# Patient Record
Sex: FEMALE | Race: WHITE | NOT HISPANIC OR LATINO | Employment: FULL TIME | URBAN - METROPOLITAN AREA
[De-identification: names, ages, dates, MRNs, and addresses within clinical notes are randomized per-mention and may not be internally consistent; named-entity substitution may affect disease eponyms.]

---

## 2017-04-28 DIAGNOSIS — E03.9 HYPOTHYROIDISM: ICD-10-CM

## 2017-04-28 PROCEDURE — 84443 ASSAY THYROID STIM HORMONE: CPT | Performed by: INTERNAL MEDICINE

## 2017-04-28 PROCEDURE — 36415 COLL VENOUS BLD VENIPUNCTURE: CPT | Performed by: INTERNAL MEDICINE

## 2017-04-28 PROCEDURE — 84439 ASSAY OF FREE THYROXINE: CPT | Performed by: INTERNAL MEDICINE

## 2017-04-29 LAB
T4 FREE SERPL-MCNC: 1.01 NG/DL (ref 0.76–1.46)
TSH SERPL DL<=0.05 MIU/L-ACNC: 2.22 MU/L (ref 0.4–4)

## 2017-05-09 DIAGNOSIS — E06.3 HASHIMOTO'S THYROIDITIS: ICD-10-CM

## 2017-05-09 DIAGNOSIS — E06.3 HYPOTHYROIDISM DUE TO HASHIMOTO'S THYROIDITIS: ICD-10-CM

## 2017-05-11 RX ORDER — LEVOTHYROXINE SODIUM 112 UG/1
TABLET ORAL
Qty: 30 TABLET | Refills: 5 | Status: SHIPPED | OUTPATIENT
Start: 2017-05-11 | End: 2017-11-17

## 2017-05-11 NOTE — TELEPHONE ENCOUNTER
synthroid     Last Written Prescription Date: 10/20/16  Last Quantity: 30, # refills: 3  Last Office Visit with [facility], [facility] or [facility] prescribing provider: 10/18/16        TSH   Date Value Ref Range Status   04/28/2017 2.22 0.40 - 4.00 mU/L Final     Prescription approved per [facility] Refill Protocol.  Same dose per lab note.   Fiordaliza Harmon, RN  Triage Nurse

## 2017-11-17 ENCOUNTER — OFFICE VISIT (OUTPATIENT)
Dept: PEDIATRICS | Facility: CLINIC | Age: 37
End: 2017-11-17
Payer: COMMERCIAL

## 2017-11-17 VITALS
DIASTOLIC BLOOD PRESSURE: 58 MMHG | SYSTOLIC BLOOD PRESSURE: 100 MMHG | TEMPERATURE: 97.8 F | HEART RATE: 88 BPM | BODY MASS INDEX: 25.13 KG/M2 | OXYGEN SATURATION: 98 % | WEIGHT: 165.8 LBS | HEIGHT: 68 IN

## 2017-11-17 DIAGNOSIS — L30.9 ECZEMA, UNSPECIFIED TYPE: Primary | ICD-10-CM

## 2017-11-17 DIAGNOSIS — E06.3 HYPOTHYROIDISM DUE TO HASHIMOTO'S THYROIDITIS: ICD-10-CM

## 2017-11-17 DIAGNOSIS — K64.4 SKIN TAG OF PERIANAL REGION: ICD-10-CM

## 2017-11-17 PROCEDURE — 99213 OFFICE O/P EST LOW 20 MIN: CPT | Performed by: INTERNAL MEDICINE

## 2017-11-17 RX ORDER — LEVOTHYROXINE SODIUM 112 UG/1
112 TABLET ORAL DAILY
Qty: 30 TABLET | Refills: 5 | Status: SHIPPED | OUTPATIENT
Start: 2017-11-17 | End: 2018-01-17

## 2017-11-17 NOTE — PATIENT INSTRUCTIONS
1. Eczematous patch on face - avoid irritants.  Apply petroleum jelly to spot at night.  2. Anal skin tag - apply petroleum jelly.  Start sitz baths daily for 1 week.  Keep area clean and observe.  3. Call clinic for mammogram referral if needed.  4. Adela Phillips will do IUD changes.

## 2017-11-17 NOTE — NURSING NOTE
"Chief Complaint   Patient presents with     Derm Problem     lump on perianal, bumps under left eyelid       Initial /58 (BP Location: Right arm, Patient Position: Chair, Cuff Size: Adult Regular)  Pulse 88  Temp 97.8  F (36.6  C) (Oral)  Ht 5' 8.25\" (1.734 m)  Wt 165 lb 12.8 oz (75.2 kg)  SpO2 98%  BMI 25.03 kg/m2 Estimated body mass index is 25.03 kg/(m^2) as calculated from the following:    Height as of this encounter: 5' 8.25\" (1.734 m).    Weight as of this encounter: 165 lb 12.8 oz (75.2 kg).  Medication Reconciliation: complete   Jeannine Louie MA 10:24 AM 11/17/2017     "

## 2017-11-17 NOTE — MR AVS SNAPSHOT
After Visit Summary   11/17/2017    Janae Stanton    MRN: [id number]           Patient Information     Date Of Birth          1980        Visit Information        Provider Department      11/17/2017 10:10 AM Laury Ruelas [facility], [facility]        Today's Diagnoses     Hypothyroidism due to Hashimoto's thyroiditis        Hashimoto's thyroiditis          Care Instructions    1. Eczematous patch on face - avoid irritants.  Apply petroleum jelly to spot at night.  2. Anal skin tag - apply petroleum jelly.  Start sitz baths daily for 1 week.  Keep area clean and observe.  3. Call clinic for mammogram referral if needed.  4. Adela Phillips will do IUD changes.            Follow-ups after your visit        Who to contact     If you have questions or need follow up information about today's clinic visit or your schedule please contact [facility] directly at [phone].  Normal or non-critical lab and imaging results will be communicated to you by "[employer]hart, letter or phone within 4 business days after the clinic has received the results. If you do not hear from us within 7 days, please contact the clinic through [employer] or phone. If you have a critical or abnormal lab result, we will notify you by phone as soon as possible.  Submit refill requests through [employer] or call your pharmacy and they will forward the refill request to us. Please allow 3 business days for your refill to be completed.          Additional Information About Your Visit        "[employer] Information     [employer] gives you secure access to your electronic health record. If you see a primary care provider, you can also send messages to your care team and make appointments. If you have questions, please call your primary care clinic.  If you do not have a primary care provider, please call [phone] and they will assist you.        Care EveryWhere ID     This is your Care EveryWhere ID. This could be used  "by other organizations to access your [facility] medical records  [id number]        Your Vitals Were     Pulse Temperature Height Pulse Oximetry BMI (Body Mass Index)       88 97.8  F (36.6  C) (Oral) 5' 8.25\" (1.734 m) 98% 25.03 kg/m2        Blood Pressure from Last 3 Encounters:   11/17/17 100/58   10/18/16 103/63   03/23/16 100/62    Weight from Last 3 Encounters:   11/17/17 165 lb 12.8 oz (75.2 kg)   10/18/16 159 lb (72.1 kg)   03/23/16 158 lb 12.8 oz (72 kg)              Today, you had the following     No orders found for display         Today's Medication Changes          These changes are accurate as of: 11/17/17 10:59 AM.  If you have any questions, ask your nurse or doctor.               These medicines have changed or have updated prescriptions.        Dose/Directions    levothyroxine 112 MCG tablet   Commonly known as:  SYNTHROID/LEVOTHROID   This may have changed:  See the new instructions.   Used for:  Hypothyroidism due to Hashimoto's thyroiditis, Hashimoto's thyroiditis   Changed by:  Laury Ruelas Mai, MD        Dose:  112 mcg   Take 1 tablet (112 mcg) by mouth daily   Quantity:  30 tablet   Refills:  5            Where to get your medicines      These medications were sent to [facility] - [address]  [address], [place] [address]     Phone:  [phone]     levothyroxine 112 MCG tablet                Primary Care Provider Office Phone # Fax #    Sandra Duffy [phone] [address]  [facility] [address]        Equal Access to Services     [facility] GIL : Hadii jose guadalupe Crocker, waaxda luqadaha, qaybta kaaljuan carlos urbina. So [facility] [phone].    ATENCIÓN: Si habla español, tiene a emmanuel disposición servicios gratuitos de asistencia lingüística. Llame al [phone].    We comply with applicable federal civil rights laws and [place] laws. We do not " discriminate on the basis of race, color, national origin, age, disability, sex, sexual orientation, or gender identity.            Thank you!     Thank you for choosing [facility]  for your care. Our goal is always to provide you with excellent care. Hearing back from our patients is one way we can continue to improve our services. Please take a few minutes to complete the written survey that you may receive in the mail after your visit with us. Thank you!             Your Updated Medication List - Protect others around you: Learn how to safely use, store and throw away your medicines at www.disposemymeds.org.          This list is accurate as of: 11/17/17 10:59 AM.  Always use your most recent med list.                   Brand Name Dispense Instructions for use Diagnosis    levonorgestrel 20 MCG/24HR IUD    MIRENA    1 each    1 each (20 mcg) by Intrauterine route once for 1 dose Started 11/2013        levothyroxine 112 MCG tablet    SYNTHROID/LEVOTHROID    30 tablet    Take 1 tablet (112 mcg) by mouth daily    Hypothyroidism due to Hashimoto's thyroiditis, Hashimoto's thyroiditis

## 2017-11-17 NOTE — PROGRESS NOTES
SUBJECTIVE:   Janae Stanton is a 37 year old female who presents to clinic today for the following health issues:    Lumps on skin      Duration: 4 weeks ago in perianal area, 3 weeks for under eye    Description (location/character/radiation): n/a    Intensity:  moderate    Accompanying signs and symptoms: redness     History (similar episodes/previous evaluation): None    Precipitating or alleviating factors: None    Therapies tried and outcome: Nystatin, hydrocortisone cream and lotion for bumps under the eyes, no relief     PROBLEMS TO ADD ON:  Pt has not established a OB GYN and has a Mirena IUD that is due to be changed. She would like to discuss options on who to see for removing and replacing with new Mirena or discussing other BC options. She would also like a referral for a mammo.       Problem list and histories reviewed & adjusted, as indicated.  Additional history: as documented    Patient Active Problem List   Diagnosis     H/O vitamin D deficiency     CARDIOVASCULAR SCREENING; LDL GOAL LESS THAN 160     Hypothyroidism     IUD (intrauterine device) in place     Hashimoto's thyroiditis     Past Surgical History:   Procedure Laterality Date     NO HISTORY OF SURGERY         Social History   Substance Use Topics     Smoking status: Never Smoker     Smokeless tobacco: Never Used     Alcohol use 0.0 oz/week     0 Standard drinks or equivalent per week      Comment: Rare     Family History   Problem Relation Age of Onset     Hypertension Mother      alive age 59  DM, lipids     Depression Father      alive age 61 PTST     Thyroid Disease Sister      alive age 32     Family History Negative Sister      alive age  30 sinus problems     Depression Sister      alive age  27     HEART DISEASE Paternal Grandmother           HEART DISEASE Maternal Grandmother           Thyroid Disease Maternal Grandfather          Current Outpatient Prescriptions   Medication Sig Dispense Refill      "levothyroxine (SYNTHROID/LEVOTHROID) 112 MCG tablet Take 1 tablet (112 mcg) by mouth daily 30 tablet 5     levonorgestrel (MIRENA) 20 MCG/24HR IUD 1 each (20 mcg) by Intrauterine route once for 1 dose Started 11/2013 1 each 0     [DISCONTINUED] levothyroxine (SYNTHROID/LEVOTHROID) 112 MCG tablet TAKE ONE TABLET BY MOUTH EVERY DAY 30 tablet 5     Allergies   Allergen Reactions     Sulfa Drugs      GI issues         Reviewed and updated as needed this visit by clinical staff     Reviewed and updated as needed this visit by Provider         ROS:  All other systems on a 10-point review are negative, unless otherwise noted in HPI    OBJECTIVE:     /58 (BP Location: Right arm, Patient Position: Chair, Cuff Size: Adult Regular)  Pulse 88  Temp 97.8  F (36.6  C) (Oral)  Ht 5' 8.25\" (1.734 m)  Wt 165 lb 12.8 oz (75.2 kg)  SpO2 98%  BMI 25.03 kg/m2  Body mass index is 25.03 kg/(m^2).  GENERAL: healthy, alert and no distress  NECK: no adenopathy, no asymmetry, masses, or scars and thyroid normal to palpation  RESP: lungs clear to auscultation - no rales, rhonchi or wheezes  CV: regular rate and rhythm, normal S1 S2, no S3 or S4, no murmur, click or rub, no peripheral edema and peripheral pulses strong  ABDOMEN: soft, nontender, no hepatosplenomegaly, no masses and bowel sounds normal  MS: no gross musculoskeletal defects noted, no edema  SKIN: face - small 1-2 mm pink papule with surrounding patchiness under left eye.  PERENUM: skin tag present at right side of anus approx 0.5 cm in size with 2 mm clean, shallow fissure near its base.  No bleeding, sinus track present, no drainage/pus, no erythema or swelling.    ASSESSMENT/PLAN:         1. Eczema, unspecified type Eczematous patch on face   - avoid irritants.  Apply petroleum jelly to spot at night.    2. Skin tag of perianal region - with tiny clean, shallow anal fissure  - Apply petroleum jelly.    - Start sitz baths daily for 1 week.    - Keep area clean and " observe.    3. Hypothyroidism due to Hashimoto's thyroiditis  Refill provided  - levothyroxine (SYNTHROID/LEVOTHROID) 112 MCG tablet; Take 1 tablet (112 mcg) by mouth daily  Dispense: 30 tablet; Refill: 5    Referral to Adela Recinos for IUD removal and placement    See Patient Instructions    Sahnnen Ruelas MD  [facility]

## 2017-12-07 ENCOUNTER — MYC MEDICAL ADVICE (OUTPATIENT)
Dept: PEDIATRICS | Facility: CLINIC | Age: 37
End: 2017-12-07

## 2017-12-07 DIAGNOSIS — E06.3 HASHIMOTO'S THYROIDITIS: Primary | ICD-10-CM

## 2017-12-07 DIAGNOSIS — E06.3 HYPOTHYROIDISM DUE TO HASHIMOTO'S THYROIDITIS: ICD-10-CM

## 2017-12-07 NOTE — TELEPHONE ENCOUNTER
Patient asking for a referral to [facility] [place] [facility] in [place].  Pended, please review and sign if ok.  Martha Redmond RN  Message handled by Nurse Triage.

## 2017-12-15 ENCOUNTER — TELEPHONE (OUTPATIENT)
Dept: ENDOCRINOLOGY | Facility: CLINIC | Age: 37
End: 2017-12-15

## 2017-12-15 NOTE — TELEPHONE ENCOUNTER
----- Message from Rebecca Guthrie sent at 12/15/2017  2:34 PM CST -----  Regarding: New Referral- Hashimoto's  Contact: [phone]  Pt is being referred by Dr. Barron for Hashimoto's thyroiditis. Referral and records are in the system.     Thank you,    Rebecca  Referral / Discharge Coordinator

## 2017-12-15 NOTE — TELEPHONE ENCOUNTER
To schedulers: Please schedule her for lst available endocrine    Mitzi Castaneda MD  Endocrine triage

## 2018-01-13 ASSESSMENT — ENCOUNTER SYMPTOMS
WEIGHT LOSS: 0
DOUBLE VISION: 0
EYE PAIN: 0
EYE REDNESS: 0
DECREASED APPETITE: 0
EYE WATERING: 0
POLYDIPSIA: 0
FATIGUE: 1
WEIGHT GAIN: 1
EYE IRRITATION: 0
POLYPHAGIA: 0
NIGHT SWEATS: 1
HALLUCINATIONS: 0
POOR WOUND HEALING: 0
SKIN CHANGES: 0
NAIL CHANGES: 0
FEVER: 0
CHILLS: 1
ALTERED TEMPERATURE REGULATION: 0
INCREASED ENERGY: 0

## 2018-01-17 ENCOUNTER — OFFICE VISIT (OUTPATIENT)
Dept: ENDOCRINOLOGY | Facility: CLINIC | Age: 38
End: 2018-01-17
Payer: COMMERCIAL

## 2018-01-17 VITALS
HEIGHT: 68 IN | SYSTOLIC BLOOD PRESSURE: 110 MMHG | WEIGHT: 160.4 LBS | DIASTOLIC BLOOD PRESSURE: 67 MMHG | HEART RATE: 97 BPM | BODY MASS INDEX: 24.31 KG/M2

## 2018-01-17 DIAGNOSIS — R53.83 FATIGUE, UNSPECIFIED TYPE: ICD-10-CM

## 2018-01-17 DIAGNOSIS — E03.9 ACQUIRED HYPOTHYROIDISM: Primary | ICD-10-CM

## 2018-01-17 DIAGNOSIS — E06.3 HASHIMOTO'S THYROIDITIS: ICD-10-CM

## 2018-01-17 DIAGNOSIS — E06.3 HYPOTHYROIDISM DUE TO HASHIMOTO'S THYROIDITIS: ICD-10-CM

## 2018-01-17 DIAGNOSIS — E06.3 HASHIMOTO'S THYROIDITIS: Primary | ICD-10-CM

## 2018-01-17 LAB
ALBUMIN SERPL-MCNC: 4 G/DL (ref 3.4–5)
ALP SERPL-CCNC: 51 U/L (ref 40–150)
ALT SERPL W P-5'-P-CCNC: 16 U/L (ref 0–50)
ANION GAP SERPL CALCULATED.3IONS-SCNC: 8 MMOL/L (ref 3–14)
AST SERPL W P-5'-P-CCNC: 15 U/L (ref 0–45)
BILIRUB SERPL-MCNC: 0.5 MG/DL (ref 0.2–1.3)
BUN SERPL-MCNC: 10 MG/DL (ref 7–30)
CALCIUM SERPL-MCNC: 8.9 MG/DL (ref 8.5–10.1)
CHLORIDE SERPL-SCNC: 105 MMOL/L (ref 94–109)
CO2 SERPL-SCNC: 26 MMOL/L (ref 20–32)
CREAT SERPL-MCNC: 0.7 MG/DL (ref 0.52–1.04)
DEPRECATED CALCIDIOL+CALCIFEROL SERPL-MC: 21 UG/L (ref 20–75)
ERYTHROCYTE [DISTWIDTH] IN BLOOD BY AUTOMATED COUNT: 12 % (ref 10–15)
FERRITIN SERPL-MCNC: 78 NG/ML (ref 12–150)
GFR SERPL CREATININE-BSD FRML MDRD: >90 ML/MIN/1.7M2
GLUCOSE SERPL-MCNC: 93 MG/DL (ref 70–99)
HCT VFR BLD AUTO: 39.1 % (ref 35–47)
HGB BLD-MCNC: 13.1 G/DL (ref 11.7–15.7)
MCH RBC QN AUTO: 29.8 PG (ref 26.5–33)
MCHC RBC AUTO-ENTMCNC: 33.5 G/DL (ref 31.5–36.5)
MCV RBC AUTO: 89 FL (ref 78–100)
PLATELET # BLD AUTO: 281 10E9/L (ref 150–450)
POTASSIUM SERPL-SCNC: 4.1 MMOL/L (ref 3.4–5.3)
PROT SERPL-MCNC: 7.5 G/DL (ref 6.8–8.8)
RBC # BLD AUTO: 4.4 10E12/L (ref 3.8–5.2)
SODIUM SERPL-SCNC: 139 MMOL/L (ref 133–144)
T4 FREE SERPL-MCNC: 1.01 NG/DL (ref 0.76–1.46)
TSH SERPL DL<=0.005 MIU/L-ACNC: 4.35 MU/L (ref 0.4–4)
VIT B12 SERPL-MCNC: 378 PG/ML (ref 193–986)
WBC # BLD AUTO: 5 10E9/L (ref 4–11)

## 2018-01-17 RX ORDER — LEVOTHYROXINE SODIUM 125 UG/1
125 TABLET ORAL DAILY
Qty: 90 TABLET | Refills: 3 | Status: SHIPPED | OUTPATIENT
Start: 2018-01-17 | End: 2019-02-26

## 2018-01-17 ASSESSMENT — PAIN SCALES - GENERAL: PAINLEVEL: NO PAIN (0)

## 2018-01-17 NOTE — MR AVS SNAPSHOT
After Visit Summary   1/17/2018    Janae Stanton    MRN: [id number]           Patient Information     Date Of Birth          1980        Visit Information        Provider Department      1/17/2018 7:00 AM Kaleigh Griffin MD [facility]        Today's Diagnoses     Hashimoto's thyroiditis    -  1    Hypothyroidism due to Hashimoto's thyroiditis        Fatigue, unspecified type           Follow-ups after your visit        Follow-up notes from your care team     Return in about 6 weeks (around 2/28/2018).      Your next 10 appointments already scheduled     Feb 28, 2018  7:00 AM CST   (Arrive by 6:45 AM)   RETURN ENDOCRINE with MD KRISTOPHER Cedeno [facility] ([facility] [place])    [address] [address] [phone]              Who to contact     Please call your clinic at [phone] to:    Ask questions about your health    Make or cancel appointments    Discuss your medicines    Learn about your test results    Speak to your doctor   If you have compliments or concerns about an experience at your clinic, or if you wish to file a complaint, please contact [facility] Patient Relations at [phone] or email us at [email or web address]         Additional Information About Your Visit        MyChart Information     [employer] gives you secure access to your electronic health record. If you see a primary care provider, you can also send messages to your care team and make appointments. If you have questions, please call your primary care clinic.  If you do not have a primary care provider, please call [phone] and they will assist you.      [employer] is an electronic gateway that provides easy, online access to your medical records. With [employer], you can request a clinic appointment, read your test results, renew a prescription or communicate with your care team.     To access  "your existing account, please contact your [facility] or call [phone] for assistance.        Care EveryWhere ID     This is your Care EveryWhere ID. This could be used by other organizations to access your [facility] medical records  [id number]        Your Vitals Were     Pulse Height BMI (Body Mass Index)             97 1.727 m (5' 8\") 24.39 kg/m2          Blood Pressure from Last 3 Encounters:   01/17/18 110/67   11/17/17 100/58   10/18/16 103/63    Weight from Last 3 Encounters:   01/17/18 72.8 kg (160 lb 6.4 oz)   11/17/17 75.2 kg (165 lb 12.8 oz)   10/18/16 72.1 kg (159 lb)               Primary Care Provider Office Phone # Fax #    Sandra Duffy [phone] [address] [address]        Equal Access to Services     CHELSI CORDERO AH: Hadii aad ku hadasho Soomaali, waaxda luqadaha, qaybta kaalmada adeegyada, waxay idiin hayaan chandler [place] [address]rd . So [facility] [phone].    ATENCIÓN: Si oliviala espjeff, tiene a emmanuel disposición servicios gratuitos de asistencia lingüística. Llame al [phone].    We comply with applicable federal civil rights laws and [place] laws. We do not discriminate on the basis of race, color, national origin, age, disability, sex, sexual orientation, or gender identity.            Thank you!     Thank you for choosing [facility]  for your care. Our goal is always to provide you with excellent care. Hearing back from our patients is one way we can continue to improve our services. Please take a few minutes to complete the written survey that you may receive in the mail after your visit with us. Thank you!             Your Updated Medication List - Protect others around you: Learn how to safely use, store and throw away your medicines at www.disposemymeds.org.          This list is accurate as of: 1/17/18  8:04 AM.  Always use your most recent med list.                   Brand Name Dispense Instructions " for use Diagnosis    levonorgestrel 20 MCG/24HR IUD    MIRENA    1 each    1 each (20 mcg) by Intrauterine route once for 1 dose Started 11/2013        levothyroxine 112 MCG tablet    SYNTHROID/LEVOTHROID    30 tablet    Take 1 tablet (112 mcg) by mouth daily    Hypothyroidism due to Hashimoto's thyroiditis

## 2018-01-17 NOTE — PROGRESS NOTES
Endocrinology Clinic Visit 1/17/2018  NAME:  Janae Stanton  PCP:  CliveSandra AJ  MRN:  [id number]  Reason for Consult:  Hypothyroidism  Requesting Provider:  Jossy Barron    Chief Complaint     Chief Complaint   Patient presents with     Consult     NEW HASHIMOTOS       History of Present Illness     Janae Stanton is a 37 year old female who is seen in clinic for management of hypothyroidism.    The patient was diagnosed with hypothyroidism in 2005, about 3 months after delivery of her first child.  TSH then was around 5.  She had it checked because her sister was diagnosed with hypothyroidism.  The patient herself did not have many symptoms then, but elected to start on levothyroxine.  She then moved to [place] in 2007 and stayed there until 2014.  She had 2 children there and was managed during her pregnancy is and throughout her course there by an endocrinologist.  Her dose prior to returning to the [place] was 88 mcg.  After returning here her dose needs went up.  At this point she is taking 112 mcg a day.  He reports that sometimes she will miss a day or a few days because she is so busy.  Her TSH here since her return has fluctuated mostly between 0.9 and 5.3.  Last TSH was 2.2 in April 2017.    The patient reports that lately she has been feeling more fatigued.  Has been developing gradually in the last 1-2 years, and getting worse.  She feels that by mid morning she is ready to go back to sleep.  She also can sit on the couch in the evening and fall asleep if she could.  Having to take care of her kids is what keeps her up.  She goes to bed around 10 or 11 and gets at least 7 hours of sleep at night every day.  She used to play a lot of sports in college and used to exercise more avidly, but right now she is not following any routine exercise regimen, but she is active in her job and with her kids.  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  Lately she has  noticed weight gain of about 10 pounds in the last year which for her is significant, and it has been harder to lose it.  She has been able to lose 5 pounds in the last 2 months by intensifying her exercise size and diet.  Mostly watching her diet especially carbohydrates, and eating less.  She finds it harder now to lose weight compared to the past.  Her menses is regular and she is on the Mirena IUD so it is very scant but is predictable.  She has been having occasional night sweats, but no hot flashes in the day.  She has some abdominal bloating and occasional constipation but no diarrhea.  She has been more irritable.  She is always cold intolerant.    No recent history of febrile illness with neck pain or sore throat.     Localized symptoms: there is no throat swelling, trouble swallowing, no SOB when lying flat, and no voice changes.     Family history of thyroid disease: Yes: sister  Family history of thyroid cancer: No  Personal history of high-dose radiation especially in childhood: No    Problem List     Patient Active Problem List   Diagnosis     H/O vitamin D deficiency     CARDIOVASCULAR SCREENING; LDL GOAL LESS THAN 160     Hypothyroidism     IUD (intrauterine device) in place     Hashimoto's thyroiditis        Medications     Current Outpatient Prescriptions   Medication     levothyroxine (SYNTHROID/LEVOTHROID) 112 MCG tablet     levonorgestrel (MIRENA) 20 MCG/24HR IUD     No current facility-administered medications for this visit.         Allergies     Allergies   Allergen Reactions     Sulfa Drugs      GI issues       Medical / Surgical History     Past Medical History:   Diagnosis Date     ALLERGIC RHINITIS  2000     CHRONIC SINUSITIS      Hashimoto's thyroiditis 10/18/2016     Hypothyroid      NONSPECIF SKIN ERUPT NEC 7/20/2007     SUPERVIS OTHER NORMAL PREG 6/26/2005     Past Surgical History:   Procedure Laterality Date     NO HISTORY OF SURGERY         Social History     Social History      Social History     Marital status:      Spouse name: Marky     Number of children: 0     Years of education: 16     Occupational History     nurse [employer]     Social History Main Topics     Smoking status: Never Smoker     Smokeless tobacco: Never Used     Alcohol use 0.0 oz/week     0 Standard drinks or equivalent per week      Comment: Rare     Drug use: No     Sexual activity: Yes     Partners: Male     Birth control/ protection: IUD     Other Topics Concern     Parent/Sibling W/ Cabg, Mi Or Angioplasty Before 65f 55m? No     Social History Narrative       Family History     Family History   Problem Relation Age of Onset     Hypertension Mother      alive age 59  DM, lipids     Depression Father      alive age 61 PTST     Thyroid Disease Sister      alive age 32     Family History Negative Sister      alive age  30 sinus problems     Depression Sister      alive age  27     HEART DISEASE Paternal Grandmother           HEART DISEASE Maternal Grandmother           Thyroid Disease Maternal Grandfather        ROS     Constitutional: no fevers, chills, night sweats. No weight loss/gain. No fatigue. Good appetite  Eyes: no vision changes, no eye redness, no diplopia  Ears, Nose, mouth, throat: no hearing changes, no tinnitus, no rhinorrhea, no nasal congestion, no anosmia  Cardiovascular: no chest pain, no orthopnea or PND, no edema, no palpitations  Respiratory: no dyspnea, no cough, no sputum, no wheezing  Gastrointestinal: no nausea, no vomiting, no abdominal pain, no diarrhea, no constipation  Genitourinary: no dysuria, no frequency, no urgency, no nocturia  Musculoskeletal: no joint pains, no back pain, no cramps, no fractures  Skin: no rash, no itching, no dryness, no ulcers, no hair loss, no nail changes  Neurological:no headaches, no weakness, no numbness, no tingling, no tremors, no difficulty sleeping, no snoring, no AM sleepiness  Psychiatric: no anxiety, no  "sadness  Hematologic/lymphatic: no easy bruising, no bleeding, no palor    Physical Exam   /67  Pulse 97  Ht 1.727 m (5' 8\")  Wt 72.8 kg (160 lb 6.4 oz)  BMI 24.39 kg/m2     General: Comfortable, no obvious distress, normal body habitus  Eyes: Sclera anicteric, moist conjunctiva, no lid lag, no exophthalmos  HENT: Atraumatic, oropharynx clear, moist mucous membranes with no mucosal ulcerations  Neck: Trachea midline, supple. Thyroid: Thyroid is normal in size and texture  CV: Regular rhythm, normal rate. No murmurs auscultated  Resp: Clear to auscultation bilaterally, good effort  Abdomen:  Soft, non tender, non distended. Bowel sounds heard. No organomegaly. No striae  Skin: No rashes, lesions, or subcutaneous nodules.   Psych: Alert and oriented x 3. Appropriate affect, good insight  Extremities: No peripheral edema  Musculoskeletal: Appropriate muscle bulk and strength  Lymphatic: No cervical lymphadenopathy  Neuro: Moves all four extremities. No focal deficits on limited exam. Gait normal. No resting tremor, deep tendon reflexes with normal relaxation phase.     Labs/Imaging     Pertinent Labs were reviewed and updated in [employer].  Radiology Results were  reviewed and updated in EPIC.    Summary of recent findings:     TSH   Date Value Ref Range Status   04/28/2017 2.22 0.40 - 4.00 mU/L Final   10/18/2016 3.92 0.40 - 4.00 mU/L Final   06/29/2016 2.78 0.40 - 4.00 mU/L Final   03/22/2016 5.37 (H) 0.40 - 4.00 mU/L Final   06/04/2015 3.11 0.40 - 4.00 mU/L Final     T4 Free   Date Value Ref Range Status   04/28/2017 1.01 0.76 - 1.46 ng/dL Final   10/18/2016 1.11 0.76 - 1.46 ng/dL Final   06/29/2016 1.02 0.76 - 1.46 ng/dL Final   03/22/2016 1.15 0.76 - 1.46 ng/dL Final   09/08/2004 1.26 0.70 - 1.85 ng/dL Final       Impression / Plan     1. (E06.3) Hashimoto's thyroiditis  (primary encounter diagnosis)  2. (E03.8,  E06.3) Hypothyroidism due to Hashimoto's thyroiditis  Comment: We discussed the pathogenesis of " hypothyroidism. In most cases it is an autoimmune process that results in lymphocytic thyroiditis, also knows as Hashimoto's thyroiditis, which gradually destroys the gland's ability to produce and secrete thyroid hormone. There is a genetic predisposition in most cases, and possibly an environmental trigger. There is no treatment that would reverse/stop the autoimmune process. Treatment is aimed at replacing thyroid hormone, with levothyroxine, which is identical to our own T4. The dose is adjusted to target a normal TSH, which is a signal from the pituitary gland.   Patients with hypothyroidism usually have positive thyroid antibodies, namely thyroid peroxidase antibody (anti-TPO) and anti-thyroglobulin antibody.     We also discussed the ideal target TSH levels.  Unclear what that would be for her however she seemed to think that her TSH was more consistent under 2 in [place] and she felt better at that level.    Plan: We will check her TSH and free T4, and aim for a TSH closer to 1 or below while still in the normal range.  We will follow-up on her symptoms in about 6 weeks.    3. (R59.83) Fatigue, unspecified type  Comment: It could be thyroid related, or other factors.  We are check thyroid status as per above #1, 2 .  she has not had any labs in the past few years.  Is a history of vitamin D deficiency.  With her autoimmune thyroid disease she is at risk for other autoimmune diseases.  I will run a general basic workup for fatigue as per the plan below.    Plan: CBC with platelets, Ferritin, Vitamin D         Deficiency (D3 Only), Vitamin B12,         Comprehensive metabolic panel    Test and/or medications prescribed today:  Orders Placed This Encounter   Procedures     CBC with platelets     Ferritin     TSH     T4 free     Vitamin D Deficiency (D3 Only)     Vitamin B12     Comprehensive metabolic panel         Follow up: 6 weeks      Kaleigh Griffin MD  Endocrinology, Diabetes and Metabolism  [facility]  [place]

## 2018-01-17 NOTE — LETTER
1/17/2018       RE: Janae Stanton  [address]  [facility] [place] [address]     Dear Colleague,    Thank you for referring your patient, Janae Stanton, to the [facility] ENDOCRINOLOGY at [facility]. Please see a copy of my visit note below.    Endocrinology Clinic Visit 1/17/2018  NAME:  Janae Stanton  PCP:  Sandra Duffy  MRN:  [id number]  Reason for Consult:  Hypothyroidism  Requesting Provider:  Jossy Barron    Chief Complaint     Chief Complaint   Patient presents with     Consult     NEW HASHIMOTOS       History of Present Illness     Janae Stanton is a 37 year old female who is seen in clinic for management of hypothyroidism.    The patient was diagnosed with hypothyroidism in 2005, about 3 months after delivery of her first child.  TSH then was around 5.  She had it checked because her sister was diagnosed with hypothyroidism.  The patient herself did not have many symptoms then, but elected to start on levothyroxine.  She then moved to [place] in 2007 and stayed there until 2014.  She had 2 children there and was managed during her pregnancy is and throughout her course there by an endocrinologist.  Her dose prior to returning to the [place] was 88 mcg.  After returning here her dose needs went up.  At this point she is taking 112 mcg a day.  He reports that sometimes she will miss a day or a few days because she is so busy.  Her TSH here since her return has fluctuated mostly between 0.9 and 5.3.  Last TSH was 2.2 in April 2017.    The patient reports that lately she has been feeling more fatigued.  Has been developing gradually in the last 1-2 years, and getting worse.  She feels that by mid morning she is ready to go back to sleep.  She also can sit on the couch in the evening and fall asleep if she could.  Having to take care of her kids is what keeps her up.  She goes to bed around 10 or 11 and gets at least 7 hours of sleep at night every day.  She  used to play a lot of sports in college and used to exercise more avidly, but right now she is not following any routine exercise regimen, but she is active in her job and with her kids.  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  Lately she has noticed weight gain of about 10 pounds in the last year which for her is significant, and it has been harder to lose it.  She has been able to lose 5 pounds in the last 2 months by intensifying her exercise size and diet.  Mostly watching her diet especially carbohydrates, and eating less.  She finds it harder now to lose weight compared to the past.  Her menses is regular and she is on the Mirena IUD so it is very scant but is predictable.  She has been having occasional night sweats, but no hot flashes in the day.  She has some abdominal bloating and occasional constipation but no diarrhea.  She has been more irritable.  She is always cold intolerant.    No recent history of febrile illness with neck pain or sore throat.     Localized symptoms: there is no throat swelling, trouble swallowing, no SOB when lying flat, and no voice changes.     Family history of thyroid disease: Yes: sister  Family history of thyroid cancer: No  Personal history of high-dose radiation especially in childhood: No    Problem List     Patient Active Problem List   Diagnosis     H/O vitamin D deficiency     CARDIOVASCULAR SCREENING; LDL GOAL LESS THAN 160     Hypothyroidism     IUD (intrauterine device) in place     Hashimoto's thyroiditis        Medications     Current Outpatient Prescriptions   Medication     levothyroxine (SYNTHROID/LEVOTHROID) 112 MCG tablet     levonorgestrel (MIRENA) 20 MCG/24HR IUD     No current facility-administered medications for this visit.         Allergies     Allergies   Allergen Reactions     Sulfa Drugs      GI issues       Medical / Surgical History     Past Medical History:   Diagnosis Date     ALLERGIC RHINITIS  2000     CHRONIC  SINUSITIS      Hashimoto's thyroiditis 10/18/2016     Hypothyroid      NONSPECIF SKIN ERUPT NEC 2007     SUPERVIS OTHER NORMAL PREG 2005     Past Surgical History:   Procedure Laterality Date     NO HISTORY OF SURGERY         Social History     Social History     Social History     Marital status:      Spouse name: Marky     Number of children: 0     Years of education: 16     Occupational History     nurse [employer]     Social History Main Topics     Smoking status: Never Smoker     Smokeless tobacco: Never Used     Alcohol use 0.0 oz/week     0 Standard drinks or equivalent per week      Comment: Rare     Drug use: No     Sexual activity: Yes     Partners: Male     Birth control/ protection: IUD     Other Topics Concern     Parent/Sibling W/ Cabg, Mi Or Angioplasty Before 65f 55m? No     Social History Narrative       Family History     Family History   Problem Relation Age of Onset     Hypertension Mother      alive age 59  DM, lipids     Depression Father      alive age 61 PTST     Thyroid Disease Sister      alive age 32     Family History Negative Sister      alive age  30 sinus problems     Depression Sister      alive age  27     HEART DISEASE Paternal Grandmother           HEART DISEASE Maternal Grandmother           Thyroid Disease Maternal Grandfather        ROS     Constitutional: no fevers, chills, night sweats. No weight loss/gain. No fatigue. Good appetite  Eyes: no vision changes, no eye redness, no diplopia  Ears, Nose, mouth, throat: no hearing changes, no tinnitus, no rhinorrhea, no nasal congestion, no anosmia  Cardiovascular: no chest pain, no orthopnea or PND, no edema, no palpitations  Respiratory: no dyspnea, no cough, no sputum, no wheezing  Gastrointestinal: no nausea, no vomiting, no abdominal pain, no diarrhea, no constipation  Genitourinary: no dysuria, no frequency, no urgency, no nocturia  Musculoskeletal: no joint pains, no back  "pain, no cramps, no fractures  Skin: no rash, no itching, no dryness, no ulcers, no hair loss, no nail changes  Neurological:no headaches, no weakness, no numbness, no tingling, no tremors, no difficulty sleeping, no snoring, no AM sleepiness  Psychiatric: no anxiety, no sadness  Hematologic/lymphatic: no easy bruising, no bleeding, no palor    Physical Exam   /67  Pulse 97  Ht 1.727 m (5' 8\")  Wt 72.8 kg (160 lb 6.4 oz)  BMI 24.39 kg/m2     General: Comfortable, no obvious distress, normal body habitus  Eyes: Sclera anicteric, moist conjunctiva, no lid lag, no exophthalmos  HENT: Atraumatic, oropharynx clear, moist mucous membranes with no mucosal ulcerations  Neck: Trachea midline, supple. Thyroid: Thyroid is normal in size and texture  CV: Regular rhythm, normal rate. No murmurs auscultated  Resp: Clear to auscultation bilaterally, good effort  Abdomen:  Soft, non tender, non distended. Bowel sounds heard. No organomegaly. No striae  Skin: No rashes, lesions, or subcutaneous nodules.   Psych: Alert and oriented x 3. Appropriate affect, good insight  Extremities: No peripheral edema  Musculoskeletal: Appropriate muscle bulk and strength  Lymphatic: No cervical lymphadenopathy  Neuro: Moves all four extremities. No focal deficits on limited exam. Gait normal. No resting tremor, deep tendon reflexes with normal relaxation phase.     Labs/Imaging     Pertinent Labs were reviewed and updated in [facility].  Radiology Results were  reviewed and updated in EPIC.    Summary of recent findings:     TSH   Date Value Ref Range Status   04/28/2017 2.22 0.40 - 4.00 mU/L Final   10/18/2016 3.92 0.40 - 4.00 mU/L Final   06/29/2016 2.78 0.40 - 4.00 mU/L Final   03/22/2016 5.37 (H) 0.40 - 4.00 mU/L Final   06/04/2015 3.11 0.40 - 4.00 mU/L Final     T4 Free   Date Value Ref Range Status   04/28/2017 1.01 0.76 - 1.46 ng/dL Final   10/18/2016 1.11 0.76 - 1.46 ng/dL Final   06/29/2016 1.02 0.76 - 1.46 ng/dL Final   03/22/2016 " 1.15 0.76 - 1.46 ng/dL Final   09/08/2004 1.26 0.70 - 1.85 ng/dL Final       Impression / Plan     1. (E06.3) Hashimoto's thyroiditis  (primary encounter diagnosis)  2. (E03.8,  E06.3) Hypothyroidism due to Hashimoto's thyroiditis  Comment: We discussed the pathogenesis of hypothyroidism. In most cases it is an autoimmune process that results in lymphocytic thyroiditis, also knows as Hashimoto's thyroiditis, which gradually destroys the gland's ability to produce and secrete thyroid hormone. There is a genetic predisposition in most cases, and possibly an environmental trigger. There is no treatment that would reverse/stop the autoimmune process. Treatment is aimed at replacing thyroid hormone, with levothyroxine, which is identical to our own T4. The dose is adjusted to target a normal TSH, which is a signal from the pituitary gland.   Patients with hypothyroidism usually have positive thyroid antibodies, namely thyroid peroxidase antibody (anti-TPO) and anti-thyroglobulin antibody.     We also discussed the ideal target TSH levels.  Unclear what that would be for her however she seemed to think that her TSH was more consistent under 2 in [place] and she felt better at that level.    Plan: We will check her TSH and free T4, and aim for a TSH closer to 1 or below while still in the normal range.  We will follow-up on her symptoms in about 6 weeks.    3. (R53.83) Fatigue, unspecified type  Comment: It could be thyroid related, or other factors.  We are check thyroid status as per above #1, 2 .  she has not had any labs in the past few years.  Is a history of vitamin D deficiency.  With her autoimmune thyroid disease she is at risk for other autoimmune diseases.  I will run a general basic workup for fatigue as per the plan below.    Plan: CBC with platelets, Ferritin, Vitamin D         Deficiency (D3 Only), Vitamin B12,         Comprehensive metabolic panel    Test and/or medications prescribed today:  Orders  Placed This Encounter   Procedures     CBC with platelets     Ferritin     TSH     T4 free     Vitamin D Deficiency (D3 Only)     Vitamin B12     Comprehensive metabolic panel         Follow up: 6 weeks      Kaleigh Griffin MD  Endocrinology, Diabetes and Metabolism  [facility]

## 2018-02-18 ENCOUNTER — OFFICE VISIT (OUTPATIENT)
Dept: URGENT CARE | Facility: URGENT CARE | Age: 38
End: 2018-02-18
Payer: COMMERCIAL

## 2018-02-18 VITALS
DIASTOLIC BLOOD PRESSURE: 60 MMHG | WEIGHT: 158 LBS | HEART RATE: 62 BPM | SYSTOLIC BLOOD PRESSURE: 96 MMHG | OXYGEN SATURATION: 99 % | BODY MASS INDEX: 24.02 KG/M2 | TEMPERATURE: 97.4 F

## 2018-02-18 DIAGNOSIS — N63.20 LEFT BREAST LUMP: Primary | ICD-10-CM

## 2018-02-18 PROCEDURE — 99214 OFFICE O/P EST MOD 30 MIN: CPT | Performed by: FAMILY MEDICINE

## 2018-02-18 RX ORDER — LEVOTHYROXINE SODIUM 112 UG/1
TABLET ORAL
COMMUNITY
Start: 2017-12-30 | End: 2019-03-01 | Stop reason: DRUGHIGH

## 2018-02-18 NOTE — PATIENT INSTRUCTIONS
follow up with your primary care provider after the imaging of the left breast is performed.

## 2018-02-18 NOTE — MR AVS SNAPSHOT
After Visit Summary   2/18/2018    Janae Stanton    MRN: [id number]           Patient Information     Date Of Birth          1980        Visit Information        Provider Department      2/18/2018 12:55 PM Beto Smith MD [facility]        Today's Diagnoses     Left breast lump    -  1      Care Instructions    follow up with your primary care provider after the imaging of the left breast is performed.                Follow-ups after your visit        Your next 10 appointments already scheduled     Feb 28, 2018  7:00 AM CST   (Arrive by 6:45 AM)   RETURN ENDOCRINE with Kaleigh Griffin MD   [facility] ([facility])    [address] [place] [address]  3rd Floor  [facility] [address] [phone]              Future tests that were ordered for you today     Open Future Orders        Priority Expected Expires Ordered    MA Diagnostic Digital Left Routine  2/18/2019 2/18/2018            Who to contact     If you have questions or need follow up information about today's clinic visit or your schedule please contact [facility] directly at [phone].  Normal or non-critical lab and imaging results will be communicated to you by Cloudvuhart, letter or phone within 4 business days after the clinic has received the results. If you do not hear from us within 7 days, please contact the clinic through Cloudvuhart or phone. If you have a critical or abnormal lab result, we will notify you by phone as soon as possible.  Submit refill requests through Spotivate or call your pharmacy and they will forward the refill request to us. Please allow 3 business days for your refill to be completed.          Additional Information About Your Visit        MyChart Information     Spotivate gives you secure access to your electronic health record. If you see a primary care provider, you can also send messages to your care team and make appointments. If you have  questions, please call your primary care clinic.  If you do not have a primary care provider, please call [phone] and they will assist you.        Care EveryWhere ID     This is your Care EveryWhere ID. This could be used by other organizations to access your [place] medical records  [id number]        Your Vitals Were     Pulse Temperature Last Period Pulse Oximetry Breastfeeding? BMI (Body Mass Index)    62 97.4  F (36.3  C) (Tympanic) 02/16/2018 (Exact Date) 99% No 24.02 kg/m2       Blood Pressure from Last 3 Encounters:   02/18/18 96/60   01/17/18 110/67   11/17/17 100/58    Weight from Last 3 Encounters:   02/18/18 158 lb (71.7 kg)   01/17/18 160 lb 6.4 oz (72.8 kg)   11/17/17 165 lb 12.8 oz (75.2 kg)               Primary Care Provider Office Phone # Fax #    Sandra Duffy [phone]       [address] [facility]  [facility] [address]        Equal Access to Services     [facility]: Hadii aad ku hadasho Soomaali, waaxda luqadaha, qaybta kaalmada adeegyada, juan carlos byrd . So [facility] [phone].    ATENCIÓN: Si habla español, tiene a emmanuel disposición servicios gratuitos de asistencia lingüística. Llame al [phone].    We comply with applicable federal civil rights laws and [place] laws. We do not discriminate on the basis of race, color, national origin, age, disability, sex, sexual orientation, or gender identity.            Thank you!     Thank you for choosing [facility]  for your care. Our goal is always to provide you with excellent care. Hearing back from our patients is one way we can continue to improve our services. Please take a few minutes to complete the written survey that you may receive in the mail after your visit with us. Thank you!             Your Updated Medication List - Protect others around you: Learn how to safely use, store and throw away your medicines at www.disposemymeds.org.          This list is accurate as of  2/18/18  1:52 PM.  Always use your most recent med list.                   Brand Name Dispense Instructions for use Diagnosis    levonorgestrel 20 MCG/24HR IUD    MIRENA    1 each    1 each (20 mcg) by Intrauterine route once for 1 dose Started 11/2013        * levothyroxine 112 MCG tablet    SYNTHROID/LEVOTHROID          * levothyroxine 125 MCG tablet    SYNTHROID/LEVOTHROID    90 tablet    Take 1 tablet (125 mcg) by mouth daily    Acquired hypothyroidism       * Notice:  This list has 2 medication(s) that are the same as other medications prescribed for you. Read the directions carefully, and ask your doctor or other care provider to review them with you.

## 2018-02-18 NOTE — NURSING NOTE
"Chief Complaint   Patient presents with     Urgent Care     Patient noticed a painful lump on her left breast last night.       Initial BP 96/60 (BP Location: Right arm, Patient Position: Sitting, Cuff Size: Adult Regular)  Pulse 62  Temp 97.4  F (36.3  C) (Tympanic)  Wt 158 lb (71.7 kg)  LMP 02/16/2018 (Exact Date)  SpO2 99%  Breastfeeding? No  BMI 24.02 kg/m2 Estimated body mass index is 24.02 kg/(m^2) as calculated from the following:    Height as of 1/17/18: 5' 8\" (1.727 m).    Weight as of this encounter: 158 lb (71.7 kg).  Medication Reconciliation: incomplete   Antoinette Dudley CMA (AAMA)            "

## 2018-02-18 NOTE — PROGRESS NOTES
SUBJECTIVE:   Janae Stanton is a 37 year old female presenting with a chief complaint of a painful lump on the left breast (lateral aspect of the left breast).  This lump was discovered last night. No redness/bruising.  No skin rubbing/injuries.   .The lump has been movable.      Patient would like a mammogram be ordered today.  She does not want to wait until she can see her primary care provider later this week. Patient did have a previous order for a left breast mammogram from March 23, 2016; however, patient did not proceed with this imaging study at that time.      Onset of symptoms was one day ago.  Course of illness is still present. .    Severity only hurts when touched.    Current and Associated symptoms: as listed above.   Treatment measures tried include Advil without much relief.  .  Predisposing factors include none. .    Past Medical History:   Diagnosis Date     ALLERGIC RHINITIS  2000     CHRONIC SINUSITIS      Hashimoto's thyroiditis 10/18/2016     Hypothyroid      NONSPECIF SKIN ERUPT NEC 7/20/2007     SUPERVIS OTHER NORMAL PREG 6/26/2005     Current Outpatient Prescriptions   Medication Sig Dispense Refill     levothyroxine (SYNTHROID/LEVOTHROID) 125 MCG tablet Take 1 tablet (125 mcg) by mouth daily 90 tablet 3     levonorgestrel (MIRENA) 20 MCG/24HR IUD 1 each (20 mcg) by Intrauterine route once for 1 dose Started 11/2013 1 each 0     levothyroxine (SYNTHROID/LEVOTHROID) 112 MCG tablet        Social History   Substance Use Topics     Smoking status: Never Smoker     Smokeless tobacco: Never Used     Alcohol use 0.0 oz/week     0 Standard drinks or equivalent per week      Comment: Rare       ROS:  Review of systems negative except as stated above.    OBJECTIVE:  BP 96/60 (BP Location: Right arm, Patient Position: Sitting, Cuff Size: Adult Regular)  Pulse 62  Temp 97.4  F (36.3  C) (Tympanic)  Wt 158 lb (71.7 kg)  LMP 02/16/2018 (Exact Date)  SpO2 99%  Breastfeeding? No  BMI 24.02  kg/m2  GENERAL APPEARANCE: healthy, alert and no distress  LEFT BREAST:  The lateral aspect of the breast has a very painful, indurated large mass that is mildly movable.  No erythema.  No hematoma.     ASSESSMENT:  Left Breast Lump    PLAN:  Per patient request, I ordered a diagnostic mammogram of the left breast (The previous 3/23/2016 test was not done, according to the patient.)    Patient will ollow up with the primary care provider after the diagnostic mammogram is performed      Beto Smith MD

## 2018-02-21 ENCOUNTER — HOSPITAL ENCOUNTER (OUTPATIENT)
Dept: MAMMOGRAPHY | Facility: CLINIC | Age: 38
Discharge: HOME | End: 2018-02-21
Attending: FAMILY MEDICINE | Admitting: FAMILY MEDICINE
Payer: COMMERCIAL

## 2018-02-21 ENCOUNTER — HOSPITAL ENCOUNTER (OUTPATIENT)
Dept: ULTRASOUND IMAGING | Facility: CLINIC | Age: 38
End: 2018-02-21
Attending: FAMILY MEDICINE
Payer: COMMERCIAL

## 2018-02-21 DIAGNOSIS — N63.20 LEFT BREAST LUMP: ICD-10-CM

## 2018-02-21 PROCEDURE — G0279 TOMOSYNTHESIS, MAMMO: HCPCS

## 2018-02-21 PROCEDURE — 76642 ULTRASOUND BREAST LIMITED: CPT | Mod: LT

## 2018-02-27 DIAGNOSIS — E03.9 ACQUIRED HYPOTHYROIDISM: ICD-10-CM

## 2018-02-27 LAB — TSH SERPL DL<=0.005 MIU/L-ACNC: 0.43 MU/L (ref 0.4–4)

## 2018-02-28 ENCOUNTER — OFFICE VISIT (OUTPATIENT)
Dept: ENDOCRINOLOGY | Facility: CLINIC | Age: 38
End: 2018-02-28
Payer: COMMERCIAL

## 2018-02-28 VITALS
SYSTOLIC BLOOD PRESSURE: 98 MMHG | HEART RATE: 71 BPM | BODY MASS INDEX: 23.55 KG/M2 | WEIGHT: 159 LBS | HEIGHT: 69 IN | DIASTOLIC BLOOD PRESSURE: 62 MMHG

## 2018-02-28 DIAGNOSIS — E06.3 HASHIMOTO'S THYROIDITIS: ICD-10-CM

## 2018-02-28 DIAGNOSIS — E06.3 HYPOTHYROIDISM DUE TO HASHIMOTO'S THYROIDITIS: Primary | ICD-10-CM

## 2018-02-28 DIAGNOSIS — R53.83 FATIGUE, UNSPECIFIED TYPE: ICD-10-CM

## 2018-02-28 NOTE — LETTER
2/28/2018       RE: Janae Stanton  [address]  [facility] [address]     Dear Colleague,    Thank you for referring your patient, Janae Stanton, to the [facility] ENDOCRINOLOGY at [facility]. Please see a copy of my visit note below.    Endocrinology Clinic Visit 1/17/2018  NAME:  Janae Stanton  PCP:  Sandra Duffy  MRN:  [id number]  Reason for Consult:  Hypothyroidism  Requesting Provider:  Jossy Barron    Chief Complaint     Chief Complaint   Patient presents with     RECHECK     HASHIMOTO'S F/U       History of Present Illness     Janae Stanton is a 37 year old female who is seen in clinic for management of hypothyroidism.    The patient was diagnosed with hypothyroidism in 2005, about 3 months after delivery of her first child.  TSH then was around 5.  She had it checked because her sister was diagnosed with hypothyroidism.  The patient herself did not have many symptoms then, but elected to start on levothyroxine.  She then moved to [place] in 2007 and stayed there until 2014.  She had 2 children there and was managed during her pregnancy is and throughout her course there by an endocrinologist.  Her dose prior to returning to the [place] was 88 mcg.  After returning here her dose needs went up.  At this point she is taking 112 mcg a day.  He reports that sometimes she will miss a day or a few days because she is so busy.  Her TSH here since her return has fluctuated mostly between 0.9 and 5.3.  Last TSH was 2.2 in April 2017.    The patient reports that lately she has been feeling more fatigued. More lethargic in the day despite getting enough sleep. Also weight gain of about 10 pounds in the last year which for her is significant, and it has been harder to lose it.  She has been able to lose 5 pounds in the last 2 months by intensifying her exercise size and diet.  Mostly watching her diet especially carbohydrates, and eating less.  She finds it  harder now to lose weight compared to the past.  Her menses is regular and she is on the Mirena IUD so it is very scant but is predictable.  She has been having occasional night sweats, but no hot flashes in the day.  She has some abdominal bloating and occasional constipation but no diarrhea.  She has been more irritable.  Also cold intolerant.    TSH checked 1/17/18 was 4.35. I increased her levothyroxine to 125 mcg po daily.  Her vitamin D level was 21 ng/mL. I asked her to take vitamin D3 1000 IU po daily.  TSH checked yesterday is 0.43.     Interval history:  Since the dose change she has felt better. Her energy level is better. Not as tired in the day. No night sweats. No blating or constipation. No cold intolerance.   Weight is down 1.5 lbs.   No palpitations or tremors. No heat intolerance.     No recent history of febrile illness with neck pain or sore throat.     Localized symptoms: there is no throat swelling, trouble swallowing, no SOB when lying flat, and no voice changes.     Family history of thyroid disease: Yes: sister  Family history of thyroid cancer: No  Personal history of high-dose radiation especially in childhood: No    Problem List     Patient Active Problem List   Diagnosis     H/O vitamin D deficiency     CARDIOVASCULAR SCREENING; LDL GOAL LESS THAN 160     Hypothyroidism     IUD (intrauterine device) in place     Hashimoto's thyroiditis        Medications     Current Outpatient Prescriptions   Medication     levothyroxine (SYNTHROID/LEVOTHROID) 125 MCG tablet     levonorgestrel (MIRENA) 20 MCG/24HR IUD     levothyroxine (SYNTHROID/LEVOTHROID) 112 MCG tablet     No current facility-administered medications for this visit.         Allergies     Allergies   Allergen Reactions     Sulfa Drugs      GI issues       Medical / Surgical History     Past Medical History:   Diagnosis Date     ALLERGIC RHINITIS  2000     CHRONIC SINUSITIS      Hashimoto's thyroiditis 10/18/2016     Hypothyroid       NONSPECIF SKIN ERUPT NEC 2007     SUPERVIS OTHER NORMAL PREG 2005     Past Surgical History:   Procedure Laterality Date     NO HISTORY OF SURGERY         Social History     Social History     Social History     Marital status:      Spouse name: Marky     Number of children: 0     Years of education: 16     Occupational History     nurse [employer]     Social History Main Topics     Smoking status: Never Smoker     Smokeless tobacco: Never Used     Alcohol use 0.0 oz/week     0 Standard drinks or equivalent per week      Comment: Rare     Drug use: No     Sexual activity: Yes     Partners: Male     Birth control/ protection: IUD     Other Topics Concern     Parent/Sibling W/ Cabg, Mi Or Angioplasty Before 65f 55m? No     Social History Narrative       Family History     Family History   Problem Relation Age of Onset     Hypertension Mother      alive age 59  DM, lipids     Depression Father      alive age 61 PTST     Thyroid Disease Sister      alive age 32     Family History Negative Sister      alive age  30 sinus problems     Depression Sister      alive age  27     HEART DISEASE Paternal Grandmother           HEART DISEASE Maternal Grandmother           Thyroid Disease Maternal Grandfather        ROS     Constitutional: no fevers, chills, night sweats. No weight loss/gain. No fatigue. Good appetite  Eyes: no vision changes, no eye redness, no diplopia  Ears, Nose, mouth, throat: no hearing changes, no tinnitus, no rhinorrhea, no nasal congestion, no anosmia  Cardiovascular: no chest pain, no orthopnea or PND, no edema, no palpitations  Respiratory: no dyspnea, no cough, no sputum, no wheezing  Gastrointestinal: no nausea, no vomiting, no abdominal pain, no diarrhea, no constipation  Genitourinary: no dysuria, no frequency, no urgency, no nocturia  Musculoskeletal: no joint pains, no back pain, no cramps, no fractures  Skin: no rash, no itching, no dryness, no  "ulcers, no hair loss, no nail changes  Neurological:no headaches, no weakness, no numbness, no tingling, no tremors, no difficulty sleeping, no snoring, no AM sleepiness  Psychiatric: no anxiety, no sadness  Hematologic/lymphatic: no easy bruising, no bleeding, no palor    Physical Exam   BP 98/62 (BP Location: Right arm, Patient Position: Sitting, Cuff Size: Adult Regular)  Pulse 71  Ht 1.74 m (5' 8.5\")  Wt 72.1 kg (159 lb)  LMP 02/16/2018 (Exact Date)  BMI 23.82 kg/m2     General: Comfortable, no obvious distress, normal body habitus  Eyes: Sclera anicteric, moist conjunctiva, no lid lag, no exophthalmos  HENT: Atraumatic, oropharynx clear, moist mucous membranes with no mucosal ulcerations  Neck: Trachea midline, supple. Thyroid: Thyroid is normal in size and texture  CV: Regular rhythm, normal rate. No murmurs auscultated  Resp: Clear to auscultation bilaterally, good effort  Abdomen:  Soft, non tender, non distended. Bowel sounds heard. No organomegaly. No striae  Skin: No rashes, lesions, or subcutaneous nodules.   Psych: Alert and oriented x 3. Appropriate affect, good insight  Extremities: No peripheral edema  Musculoskeletal: Appropriate muscle bulk and strength  Lymphatic: No cervical lymphadenopathy  Neuro: Moves all four extremities. No focal deficits on limited exam. Gait normal. No resting tremor, deep tendon reflexes with normal relaxation phase.     Labs/Imaging     Pertinent Labs were reviewed and updated in [facility].  Radiology Results were  reviewed and updated in EPIC.    Summary of recent findings:     TSH   Date Value Ref Range Status   02/27/2018 0.43 0.40 - 4.00 mU/L Final   01/17/2018 4.35 (H) 0.40 - 4.00 mU/L Final   04/28/2017 2.22 0.40 - 4.00 mU/L Final   10/18/2016 3.92 0.40 - 4.00 mU/L Final   06/29/2016 2.78 0.40 - 4.00 mU/L Final     T4 Free   Date Value Ref Range Status   01/17/2018 1.01 0.76 - 1.46 ng/dL Final   04/28/2017 1.01 0.76 - 1.46 ng/dL Final   10/18/2016 1.11 0.76 - 1.46 " ng/dL Final   06/29/2016 1.02 0.76 - 1.46 ng/dL Final   03/22/2016 1.15 0.76 - 1.46 ng/dL Final       Impression / Plan     1. (E06.3) Hashimoto's thyroiditis  (primary encounter diagnosis)  2. (E03.8,  E06.3) Hypothyroidism due to Hashimoto's thyroiditis  Comment: TSH is now normal at 0.43. She is feeling better  Plan:  - continue same dose of 125 mcg daily levothyroxine  Recheck lab in 2 months    3. (R53.83) Fatigue, unspecified type  Comment: It has improved.   Plan: continue higher dose levothyroxine    4. Borderline vitamin D:  Level is 21 ng/mL  She will start taking 1000 IU of vitamin D  We will recheck it in a few months    5. Weight gain:  It is improving with her improved thyroid status  Advised more exercise and diet    Follow up: annual.      Kaleigh Griffin MD  Endocrinology, Diabetes and Metabolism  [facility]

## 2018-02-28 NOTE — NURSING NOTE
"Chief Complaint   Patient presents with     RECHECK     HASHIMOTO'S F/U       Initial BP 98/62 (BP Location: Right arm, Patient Position: Sitting, Cuff Size: Adult Regular)  Pulse 71  Ht 1.74 m (5' 8.5\")  Wt 72.1 kg (159 lb)  LMP 02/16/2018 (Exact Date)  BMI 23.82 kg/m2 Estimated body mass index is 23.82 kg/(m^2) as calculated from the following:    Height as of this encounter: 1.74 m (5' 8.5\").    Weight as of this encounter: 72.1 kg (159 lb).  Medication Reconciliation: complete    "

## 2018-02-28 NOTE — PROGRESS NOTES
Endocrinology Clinic Visit 1/17/2018  NAME:  Janae Stanton  PCP:  Clive Sandra AJ  MRN:  [id number]  Reason for Consult:  Hypothyroidism  Requesting Provider:  Jossy Barron    Chief Complaint     Chief Complaint   Patient presents with     RECHECK     HASHIMOTO'S F/U       History of Present Illness     Janae Stanton is a 37 year old female who is seen in clinic for management of hypothyroidism.    The patient was diagnosed with hypothyroidism in 2005, about 3 months after delivery of her first child.  TSH then was around 5.  She had it checked because her sister was diagnosed with hypothyroidism.  The patient herself did not have many symptoms then, but elected to start on levothyroxine.  She then moved to [place] in 2007 and stayed there until 2014.  She had 2 children there and was managed during her pregnancy is and throughout her course there by an endocrinologist.  Her dose prior to returning to the [place] was 88 mcg.  After returning here her dose needs went up.  At this point she is taking 112 mcg a day.  He reports that sometimes she will miss a day or a few days because she is so busy.  Her TSH here since her return has fluctuated mostly between 0.9 and 5.3.  Last TSH was 2.2 in April 2017.    The patient reports that lately she has been feeling more fatigued. More lethargic in the day despite getting enough sleep. Also weight gain of about 10 pounds in the last year which for her is significant, and it has been harder to lose it.  She has been able to lose 5 pounds in the last 2 months by intensifying her exercise size and diet.  Mostly watching her diet especially carbohydrates, and eating less.  She finds it harder now to lose weight compared to the past.  Her menses is regular and she is on the Mirena IUD so it is very scant but is predictable.  She has been having occasional night sweats, but no hot flashes in the day.  She has some abdominal bloating and occasional constipation but no  diarrhea.  She has been more irritable.  Also cold intolerant.    TSH checked 1/17/18 was 4.35. I increased her levothyroxine to 125 mcg po daily.  Her vitamin D level was 21 ng/mL. I asked her to take vitamin D3 1000 IU po daily.  TSH checked yesterday is 0.43.     Interval history:  Since the dose change she has felt better. Her energy level is better. Not as tired in the day. No night sweats. No blating or constipation. No cold intolerance.   Weight is down 1.5 lbs.   No palpitations or tremors. No heat intolerance.     No recent history of febrile illness with neck pain or sore throat.     Localized symptoms: there is no throat swelling, trouble swallowing, no SOB when lying flat, and no voice changes.     Family history of thyroid disease: Yes: sister  Family history of thyroid cancer: No  Personal history of high-dose radiation especially in childhood: No    Problem List     Patient Active Problem List   Diagnosis     H/O vitamin D deficiency     CARDIOVASCULAR SCREENING; LDL GOAL LESS THAN 160     Hypothyroidism     IUD (intrauterine device) in place     Hashimoto's thyroiditis        Medications     Current Outpatient Prescriptions   Medication     levothyroxine (SYNTHROID/LEVOTHROID) 125 MCG tablet     levonorgestrel (MIRENA) 20 MCG/24HR IUD     levothyroxine (SYNTHROID/LEVOTHROID) 112 MCG tablet     No current facility-administered medications for this visit.         Allergies     Allergies   Allergen Reactions     Sulfa Drugs      GI issues       Medical / Surgical History     Past Medical History:   Diagnosis Date     ALLERGIC RHINITIS  2000     CHRONIC SINUSITIS      Hashimoto's thyroiditis 10/18/2016     Hypothyroid      NONSPECIF SKIN ERUPT NEC 7/20/2007     SUPERVIS OTHER NORMAL PREG 6/26/2005     Past Surgical History:   Procedure Laterality Date     NO HISTORY OF SURGERY         Social History     Social History     Social History     Marital status:      Spouse name: Marky     Number of  children: 0     Years of education: 16     Occupational History     nurse [employer]     Social History Main Topics     Smoking status: Never Smoker     Smokeless tobacco: Never Used     Alcohol use 0.0 oz/week     0 Standard drinks or equivalent per week      Comment: Rare     Drug use: No     Sexual activity: Yes     Partners: Male     Birth control/ protection: IUD     Other Topics Concern     Parent/Sibling W/ Cabg, Mi Or Angioplasty Before 65f 55m? No     Social History Narrative       Family History     Family History   Problem Relation Age of Onset     Hypertension Mother      alive age 59  DM, lipids     Depression Father      alive age 61 PTST     Thyroid Disease Sister      alive age 32     Family History Negative Sister      alive age  30 sinus problems     Depression Sister      alive age  27     HEART DISEASE Paternal Grandmother           HEART DISEASE Maternal Grandmother           Thyroid Disease Maternal Grandfather        ROS     Constitutional: no fevers, chills, night sweats. No weight loss/gain. No fatigue. Good appetite  Eyes: no vision changes, no eye redness, no diplopia  Ears, Nose, mouth, throat: no hearing changes, no tinnitus, no rhinorrhea, no nasal congestion, no anosmia  Cardiovascular: no chest pain, no orthopnea or PND, no edema, no palpitations  Respiratory: no dyspnea, no cough, no sputum, no wheezing  Gastrointestinal: no nausea, no vomiting, no abdominal pain, no diarrhea, no constipation  Genitourinary: no dysuria, no frequency, no urgency, no nocturia  Musculoskeletal: no joint pains, no back pain, no cramps, no fractures  Skin: no rash, no itching, no dryness, no ulcers, no hair loss, no nail changes  Neurological:no headaches, no weakness, no numbness, no tingling, no tremors, no difficulty sleeping, no snoring, no AM sleepiness  Psychiatric: no anxiety, no sadness  Hematologic/lymphatic: no easy bruising, no bleeding, no palor    Physical  "Exam   BP 98/62 (BP Location: Right arm, Patient Position: Sitting, Cuff Size: Adult Regular)  Pulse 71  Ht 1.74 m (5' 8.5\")  Wt 72.1 kg (159 lb)  LMP 02/16/2018 (Exact Date)  BMI 23.82 kg/m2     General: Comfortable, no obvious distress, normal body habitus  Eyes: Sclera anicteric, moist conjunctiva, no lid lag, no exophthalmos  HENT: Atraumatic, oropharynx clear, moist mucous membranes with no mucosal ulcerations  Neck: Trachea midline, supple. Thyroid: Thyroid is normal in size and texture  CV: Regular rhythm, normal rate. No murmurs auscultated  Resp: Clear to auscultation bilaterally, good effort  Abdomen:  Soft, non tender, non distended. Bowel sounds heard. No organomegaly. No striae  Skin: No rashes, lesions, or subcutaneous nodules.   Psych: Alert and oriented x 3. Appropriate affect, good insight  Extremities: No peripheral edema  Musculoskeletal: Appropriate muscle bulk and strength  Lymphatic: No cervical lymphadenopathy  Neuro: Moves all four extremities. No focal deficits on limited exam. Gait normal. No resting tremor, deep tendon reflexes with normal relaxation phase.     Labs/Imaging     Pertinent Labs were reviewed and updated in [employer].  Radiology Results were  reviewed and updated in EPIC.    Summary of recent findings:     TSH   Date Value Ref Range Status   02/27/2018 0.43 0.40 - 4.00 mU/L Final   01/17/2018 4.35 (H) 0.40 - 4.00 mU/L Final   04/28/2017 2.22 0.40 - 4.00 mU/L Final   10/18/2016 3.92 0.40 - 4.00 mU/L Final   06/29/2016 2.78 0.40 - 4.00 mU/L Final     T4 Free   Date Value Ref Range Status   01/17/2018 1.01 0.76 - 1.46 ng/dL Final   04/28/2017 1.01 0.76 - 1.46 ng/dL Final   10/18/2016 1.11 0.76 - 1.46 ng/dL Final   06/29/2016 1.02 0.76 - 1.46 ng/dL Final   03/22/2016 1.15 0.76 - 1.46 ng/dL Final       Impression / Plan     1. (E06.3) Hashimoto's thyroiditis  (primary encounter diagnosis)  2. (E03.8,  E06.3) Hypothyroidism due to Hashimoto's thyroiditis  Comment: TSH is now normal " at 0.43. She is feeling better  Plan:  - continue same dose of 125 mcg daily levothyroxine  Recheck lab in 2 months    3. (R53.83) Fatigue, unspecified type  Comment: It has improved.   Plan: continue higher dose levothyroxine    4. Borderline vitamin D:  Level is 21 ng/mL  She will start taking 1000 IU of vitamin D  We will recheck it in a few months    5. Weight gain:  It is improving with her improved thyroid status  Advised more exercise and diet    Follow up: annual.      Kaleigh Griffin MD  Endocrinology, Diabetes and Metabolism  [facility]

## 2018-04-25 DIAGNOSIS — E06.3 HASHIMOTO'S THYROIDITIS: ICD-10-CM

## 2018-04-25 DIAGNOSIS — E06.3 HYPOTHYROIDISM DUE TO HASHIMOTO'S THYROIDITIS: ICD-10-CM

## 2018-04-25 LAB — TSH SERPL DL<=0.005 MIU/L-ACNC: 0.25 MU/L (ref 0.4–4)

## 2018-04-27 DIAGNOSIS — E03.9 ACQUIRED HYPOTHYROIDISM: Primary | ICD-10-CM

## 2018-07-03 DIAGNOSIS — E03.9 ACQUIRED HYPOTHYROIDISM: ICD-10-CM

## 2018-07-03 LAB
T4 FREE SERPL-MCNC: 0.96 NG/DL (ref 0.76–1.46)
TSH SERPL DL<=0.005 MIU/L-ACNC: 9.36 MU/L (ref 0.4–4)

## 2018-07-23 DIAGNOSIS — E06.3 HYPOTHYROIDISM DUE TO HASHIMOTO'S THYROIDITIS: Primary | ICD-10-CM

## 2018-09-05 ENCOUNTER — MYC MEDICAL ADVICE (OUTPATIENT)
Dept: ENDOCRINOLOGY | Facility: CLINIC | Age: 38
End: 2018-09-05

## 2018-09-05 DIAGNOSIS — E03.9 ACQUIRED HYPOTHYROIDISM: Primary | ICD-10-CM

## 2018-09-05 DIAGNOSIS — E06.3 HYPOTHYROIDISM DUE TO HASHIMOTO'S THYROIDITIS: ICD-10-CM

## 2018-09-05 LAB — TSH SERPL DL<=0.005 MIU/L-ACNC: 2.5 MU/L (ref 0.4–4)

## 2018-10-19 DIAGNOSIS — E03.9 ACQUIRED HYPOTHYROIDISM: ICD-10-CM

## 2018-10-19 LAB — TSH SERPL DL<=0.005 MIU/L-ACNC: 2.96 MU/L (ref 0.4–4)

## 2019-02-26 DIAGNOSIS — E03.9 ACQUIRED HYPOTHYROIDISM: ICD-10-CM

## 2019-02-26 RX ORDER — LEVOTHYROXINE SODIUM 125 UG/1
125 TABLET ORAL DAILY
Qty: 30 TABLET | Refills: 0 | Status: SHIPPED | OUTPATIENT
Start: 2019-02-26 | End: 2019-04-17

## 2019-02-26 NOTE — TELEPHONE ENCOUNTER
Last Clinic Visit:  1/17/18   NV: NONE   30 DAY RF   Scheduling has been notified to contact the pt for appointment

## 2019-03-01 ENCOUNTER — OFFICE VISIT (OUTPATIENT)
Dept: OBGYN | Facility: CLINIC | Age: 39
End: 2019-03-01
Payer: COMMERCIAL

## 2019-03-01 VITALS
DIASTOLIC BLOOD PRESSURE: 54 MMHG | WEIGHT: 154 LBS | BODY MASS INDEX: 22.81 KG/M2 | HEIGHT: 69 IN | SYSTOLIC BLOOD PRESSURE: 84 MMHG

## 2019-03-01 DIAGNOSIS — Z12.4 SCREENING FOR CERVICAL CANCER: Primary | ICD-10-CM

## 2019-03-01 DIAGNOSIS — Z30.433 ENCOUNTER FOR REMOVAL AND REINSERTION OF IUD: ICD-10-CM

## 2019-03-01 DIAGNOSIS — Z30.433 ENCOUNTER FOR REMOVAL AND REINSERTION OF INTRAUTERINE CONTRACEPTIVE DEVICE: ICD-10-CM

## 2019-03-01 DIAGNOSIS — Z30.46 SURVEILLANCE OF PREVIOUSLY PRESCRIBED IMPLANTABLE SUBDERMAL CONTRACEPTIVE: ICD-10-CM

## 2019-03-01 PROCEDURE — 58301 REMOVE INTRAUTERINE DEVICE: CPT | Performed by: OBSTETRICS & GYNECOLOGY

## 2019-03-01 PROCEDURE — 58300 INSERT INTRAUTERINE DEVICE: CPT | Performed by: OBSTETRICS & GYNECOLOGY

## 2019-03-01 PROCEDURE — 87624 HPV HI-RISK TYP POOLED RSLT: CPT | Performed by: OBSTETRICS & GYNECOLOGY

## 2019-03-01 PROCEDURE — G0145 SCR C/V CYTO,THINLAYER,RESCR: HCPCS | Performed by: OBSTETRICS & GYNECOLOGY

## 2019-03-01 ASSESSMENT — MIFFLIN-ST. JEOR: SCORE: 1428.95

## 2019-03-01 NOTE — PROGRESS NOTES
SUBJECTIVE:    Is a pregnancy test required: No.  IUD in situ  Was a consent obtained?  Yes    Subjective: Janae Stanton is a 38 year old  presents for IUD and desires Mirena type IUD.  She requests removal of the IUD because the IUD effectiveness has     Patient has been given the opportunity to ask questions about all forms of birth control, including all options appropriate for Janae Stanton. Discussed that no method of birth control, except abstinence is 100% effective against pregnancy or sexually transmitted infection.     Janae Stanton understands she may have the IUD removed at any time. IUD should be removed by a health care provider and the current IUD will be removed today.    The entire removal and insertion procedure was reviewed with the patient, including care after placement.    Today's PHQ-2 Score:   PHQ-2 (  [employer]) 3/23/2016   Q1: Little interest or pleasure in doing things 0   Q2: Feeling down, depressed or hopeless 0   PHQ-2 Score 0   Q1: Little interest or pleasure in doing things -   Q2: Feeling down, depressed or hopeless -   PHQ-2 Score -       PROCEDURE:    Premedicated with ibuprofen.  A speculum exam was performed and the cervix was visualized. The IUD string was visualized. Using ring forceps, the string  was grasped and the IUD removed intact.    Under sterile technique, cervix was visualized with speculum and prepped with Betadine solution swab x 3. Tenaculum was placed for stability. The uterus was gently straightened and sounded to 7.5 cm. IUD prepared for placement, and IUD inserted according to 's instructions without difficulty or significant ressitance, and deployed at the fundus. The strings were visualized and trimmed to 2.5 cm from the external os. Tenaculum was removed and hemostasis noted. Speculum removed.  Patient tolerated procedure well.    Lot # [id number]  Exp: 2021    EBL: minimal    Complications: none      POST PROCEDURE:    Given  's handouts, including when to have IUD removed, list of danger s/sx, side effects and follow up recommended. Encouraged condom use for prevention of STD. Advised to call for any fever, for prolonged or severe pain or bleeding, abnormal vaginal dischage, or unable to palpate strings. She was advised to use pain medications (ibuprofen) as needed for mild to moderate pain. Advised to follow-up in clinic in 4-6 weeks for IUD string check if unable to find strings or as directed by provider.     Richie Allen MD

## 2019-03-05 LAB
COPATH REPORT: NORMAL
PAP: NORMAL

## 2019-03-07 LAB
FINAL DIAGNOSIS: NORMAL
HPV HR 12 DNA CVX QL NAA+PROBE: NEGATIVE
HPV16 DNA SPEC QL NAA+PROBE: NEGATIVE
HPV18 DNA SPEC QL NAA+PROBE: NEGATIVE
SPECIMEN DESCRIPTION: NORMAL
SPECIMEN SOURCE CVX/VAG CYTO: NORMAL

## 2019-04-12 ENCOUNTER — TELEPHONE (OUTPATIENT)
Dept: ENDOCRINOLOGY | Facility: CLINIC | Age: 39
End: 2019-04-12

## 2019-04-12 DIAGNOSIS — E06.3 HYPOTHYROIDISM DUE TO HASHIMOTO'S THYROIDITIS: Primary | ICD-10-CM

## 2019-04-12 NOTE — TELEPHONE ENCOUNTER
[facility]    Phone Message    May a detailed message be left on voicemail: yes    Reason for Call: Other: PT is wondering if she can get a lab order for TSH before her appt. on 5/22/19.  Please follow up with the PT>      Action Taken: Message routed to:  [facility] ([facility] endo

## 2019-04-16 DIAGNOSIS — E06.3 HYPOTHYROIDISM DUE TO HASHIMOTO'S THYROIDITIS: ICD-10-CM

## 2019-04-16 LAB
T4 FREE SERPL-MCNC: 0.97 NG/DL (ref 0.76–1.46)
TSH SERPL DL<=0.005 MIU/L-ACNC: 6.89 MU/L (ref 0.4–4)

## 2019-04-17 ENCOUNTER — MYC REFILL (OUTPATIENT)
Dept: ENDOCRINOLOGY | Facility: CLINIC | Age: 39
End: 2019-04-17

## 2019-04-17 DIAGNOSIS — E03.9 ACQUIRED HYPOTHYROIDISM: ICD-10-CM

## 2019-04-17 RX ORDER — LEVOTHYROXINE SODIUM 125 UG/1
125 TABLET ORAL DAILY
Qty: 30 TABLET | Refills: 0 | Status: SHIPPED | OUTPATIENT
Start: 2019-04-17 | End: 2019-05-17

## 2019-05-08 ASSESSMENT — ENCOUNTER SYMPTOMS
INCREASED ENERGY: 0
ALTERED TEMPERATURE REGULATION: 0
FEVER: 0
POLYPHAGIA: 0
HALLUCINATIONS: 0
WEIGHT GAIN: 0
WEIGHT LOSS: 0
NIGHT SWEATS: 0
FATIGUE: 1
DECREASED APPETITE: 0
CHILLS: 0
POLYDIPSIA: 0

## 2019-05-18 DIAGNOSIS — E03.9 ACQUIRED HYPOTHYROIDISM: ICD-10-CM

## 2019-05-18 LAB — TSH SERPL DL<=0.005 MIU/L-ACNC: 3.06 MU/L (ref 0.4–4)

## 2019-05-22 ENCOUNTER — OFFICE VISIT (OUTPATIENT)
Dept: ENDOCRINOLOGY | Facility: CLINIC | Age: 39
End: 2019-05-22
Payer: COMMERCIAL

## 2019-05-22 VITALS
DIASTOLIC BLOOD PRESSURE: 66 MMHG | HEART RATE: 59 BPM | BODY MASS INDEX: 22.22 KG/M2 | WEIGHT: 150 LBS | SYSTOLIC BLOOD PRESSURE: 103 MMHG | HEIGHT: 69 IN

## 2019-05-22 DIAGNOSIS — E06.3 HYPOTHYROIDISM DUE TO HASHIMOTO'S THYROIDITIS: Primary | ICD-10-CM

## 2019-05-22 RX ORDER — LEVOTHYROXINE SODIUM 125 UG/1
125 CAPSULE ORAL DAILY
Qty: 90 CAPSULE | Refills: 3 | Status: SHIPPED | OUTPATIENT
Start: 2019-05-22 | End: 2019-06-26

## 2019-05-22 ASSESSMENT — MIFFLIN-ST. JEOR: SCORE: 1401.9

## 2019-05-22 ASSESSMENT — PAIN SCALES - GENERAL: PAINLEVEL: NO PAIN (0)

## 2019-05-22 NOTE — PROGRESS NOTES
"Endocrinology Clinic Visit 05/22/19  NAME:  Janae Stanton  PCP:  Clive Sandra ROCHA  MRN:  [id number]    Chief Complaint     Follow up. Hypothyroidism    History of Present Illness     Janae Stanton is a 39 year old female who is seen in clinic for follow up management of hypothyroidism.    The patient was diagnosed with hypothyroidism in 2005, about 3 months after delivery of her first child.  TSH then was around 5.  She had it checked because her sister was diagnosed with hypothyroidism.  The patient herself did not have many symptoms then, but elected to start on levothyroxine.  She then moved to [place] in 2007 and stayed there until 2014.  She had 2 children there and was managed during her pregnancy is and throughout her course there by an endocrinologist.  Her dose prior to returning to the [place] was 88 mcg.  After returning here her dose needs went up.     Last time I saw her Jan/Feb 2018, TSH checked 1/17/18 was 4.35. I increased her levothyroxine from 112 to 125 mcg po daily.  Her vitamin D level was 21 ng/mL. I asked her to take vitamin D3 1000 IU po daily.    Since then, her dose has fluctuated with doses ranging from 112 to 125 mcg, and then more recently, TSH was 6.89 on 4/16/19 while on 125 mcg, so I asked her to take an extra 125 mcg one day a week (8 tabs per week instead of 7 tabs). TSH checked 5/18/19 was down to 3.06.     Interval history:  She reports that she has been on the keto diet for a couple of months and has lost 8-9 lbs. She plans to transition to more \"clean\" eating. She feels that some days she is tired and sleepy at work. Otherwise no palpitations or tremors. No heat intolerance.     No recent history of febrile illness with neck pain or sore throat.     Localized symptoms: there is no throat swelling, trouble swallowing, no SOB when lying flat, and no voice changes.     Family history of thyroid disease: Yes: sister  Family history of thyroid cancer: No  Personal history of " high-dose radiation especially in childhood: No    Problem List     Patient Active Problem List   Diagnosis     H/O vitamin D deficiency     CARDIOVASCULAR SCREENING; LDL GOAL LESS THAN 160     Hypothyroidism     Hashimoto's thyroiditis     Encounter for removal and reinsertion of Mireana IUD: Due for removal 2/2024        Medications     Current Outpatient Medications   Medication     levonorgestrel (MIRENA) 20 MCG/24HR IUD     TIROSINT 125 MCG capsule     No current facility-administered medications for this visit.         Allergies     Allergies   Allergen Reactions     Sulfa Drugs      GI issues       Medical / Surgical History     Past Medical History:   Diagnosis Date     ALLERGIC RHINITIS  2000     CHRONIC SINUSITIS      Hashimoto's thyroiditis 10/18/2016     Hypothyroid      NONSPECIF SKIN ERUPT NEC 7/20/2007     SUPERVIS OTHER NORMAL PREG 6/26/2005     Past Surgical History:   Procedure Laterality Date     NO HISTORY OF SURGERY         Social History     Social History     Socioeconomic History     Marital status:      Spouse name: Marky     Number of children: 0     Years of education: 16     Highest education level: Not on file   Occupational History     Occupation: nurse     Employer: [employer]   Social Needs     Financial resource strain: Not on file     Food insecurity:     Worry: Not on file     Inability: Not on file     Transportation needs:     Medical: Not on file     Non-medical: Not on file   Tobacco Use     Smoking status: Never Smoker     Smokeless tobacco: Never Used   Substance and Sexual Activity     Alcohol use: Yes     Alcohol/week: 0.0 oz     Comment: Rare     Drug use: No     Sexual activity: Yes     Partners: Male     Birth control/protection: IUD   Lifestyle     Physical activity:     Days per week: Not on file     Minutes per session: Not on file     Stress: Not on file   Relationships     Social connections:     Talks on phone: Not on file     Gets  together: Not on file     Attends [employer] service: Not on file     Active member of club or organization: Not on file     Attends meetings of clubs or organizations: Not on file     Relationship status: Not on file     Intimate partner violence:     Fear of current or ex partner: Not on file     Emotionally abused: Not on file     Physically abused: Not on file     Forced sexual activity: Not on file   Other Topics Concern     Parent/sibling w/ CABG, MI or angioplasty before 65F 55M? No   Social History Narrative     Not on file       Family History     Family History   Problem Relation Age of Onset     Hypertension Mother         alive age 59  DM, lipids     Depression Father         alive age 61 PTST     Thyroid Disease Sister         alive age 32     Family History Negative Sister         alive age  30 sinus problems     Depression Sister         alive age  27     Heart Disease Paternal Grandmother              Heart Disease Maternal Grandmother              Thyroid Disease Maternal Grandfather        ROS     Constitutional: no fevers, chills, night sweats. No weight loss/gain. No fatigue. Good appetite  Eyes: no vision changes, no eye redness, no diplopia  Ears, Nose, mouth, throat: no hearing changes, no tinnitus, no rhinorrhea, no nasal congestion, no anosmia  Cardiovascular: no chest pain, no orthopnea or PND, no edema, no palpitations  Respiratory: no dyspnea, no cough, no sputum, no wheezing  Gastrointestinal: no nausea, no vomiting, no abdominal pain, no diarrhea, no constipation  Genitourinary: no dysuria, no frequency, no urgency, no nocturia  Musculoskeletal: no joint pains, no back pain, no cramps, no fractures  Skin: no rash, no itching, no dryness, no ulcers, no hair loss, no nail changes  Neurological:no headaches, no weakness, no numbness, no tingling, no tremors, no difficulty sleeping  Psychiatric: no anxiety, no sadness  Hematologic/lymphatic: no easy bruising, no bleeding, no  "palor    Physical Exam   /66   Pulse 59   Ht 1.74 m (5' 8.5\")   Wt 68 kg (150 lb)   BMI 22.47 kg/m       General: Comfortable, no obvious distress, normal body habitus  Eyes: Sclera anicteric, moist conjunctiva, no lid lag, no exophthalmos  HENT: Atraumatic, oropharynx clear, moist mucous membranes with no mucosal ulcerations  Neck: Trachea midline, supple. Thyroid: Thyroid is normal in size and texture  CV: Regular rhythm, normal rate. No murmurs auscultated  Resp: Clear to auscultation bilaterally, good effort  Abdomen:  Soft, non tender, non distended. Bowel sounds heard. No organomegaly. No striae  Skin: No rashes, lesions, or subcutaneous nodules.   Psych: Alert and oriented x 3. Appropriate affect, good insight  Extremities: No peripheral edema  Musculoskeletal: Appropriate muscle bulk and strength  Lymphatic: No cervical lymphadenopathy  Neuro: Moves all four extremities. No focal deficits on limited exam. Gait normal. No resting tremor, deep tendon reflexes with normal relaxation phase.     Labs/Imaging     Pertinent Labs were reviewed and updated in [facility].  Radiology Results were  reviewed and updated in EPIC.    Summary of recent findings:     TSH   Date Value Ref Range Status   05/18/2019 3.06 0.40 - 4.00 mU/L Final   04/16/2019 6.89 (H) 0.40 - 4.00 mU/L Final   10/19/2018 2.96 0.40 - 4.00 mU/L Final   09/05/2018 2.50 0.40 - 4.00 mU/L Final   07/03/2018 9.36 (H) 0.40 - 4.00 mU/L Final     T4 Free   Date Value Ref Range Status   04/16/2019 0.97 0.76 - 1.46 ng/dL Final   07/03/2018 0.96 0.76 - 1.46 ng/dL Final   01/17/2018 1.01 0.76 - 1.46 ng/dL Final   04/28/2017 1.01 0.76 - 1.46 ng/dL Final   10/18/2016 1.11 0.76 - 1.46 ng/dL Final       Impression / Plan     1. (E06.3) Hashimoto's thyroiditis  (primary encounter diagnosis)  2. (E03.8,  E06.3) Hypothyroidism due to Hashimoto's thyroiditis  Comment: her dose has fluctuated in recent months from 112 to 125 and now to 142 mcg. TSH is up and down. "   This is despite excellent compliance.   Plan:  - switch to Tirosint.   - starting dose 125 mcg daily  - TSH in 6 weeks    3. (R53.83) Fatigue, unspecified type  Comment: It is intermittent.   Plan: trial of Tirosint as per above    4. Weight loss:  We discussed dietary approached. Advised that she watch for and try to reduce animal sources of food. Eat as much unprocessed food.     Follow up: annual.      Kaleigh Griffin MD  Endocrinology, Diabetes and Metabolism  [facility]

## 2019-05-22 NOTE — NURSING NOTE
Chief Complaint   Patient presents with     RECHECK     Hashimoto's Thyroiditis     Nayeli Burgos MA

## 2019-05-22 NOTE — LETTER
"5/22/2019       RE: Janae Stanton  [address] [place] [address]     Dear Colleague,    Thank you for referring your patient, Janae Stanton, to the [facility] ENDOCRINOLOGY at [facility]. Please see a copy of my visit note below.    Endocrinology Clinic Visit 05/22/19  NAME:  Janae Stanton  PCP:  Sandra Duffy  MRN:  [id number]    Chief Complaint     Follow up. Hypothyroidism    History of Present Illness     Janae Stanton is a 39 year old female who is seen in clinic for follow up management of hypothyroidism.    The patient was diagnosed with hypothyroidism in 2005, about 3 months after delivery of her first child.  TSH then was around 5.  She had it checked because her sister was diagnosed with hypothyroidism.  The patient herself did not have many symptoms then, but elected to start on levothyroxine.  She then moved to [place] in 2007 and stayed there until 2014.  She had 2 children there and was managed during her pregnancy is and throughout her course there by an endocrinologist.  Her dose prior to returning to the [place] was 88 mcg.  After returning here her dose needs went up.     Last time I saw her Jan/Feb 2018, TSH checked 1/17/18 was 4.35. I increased her levothyroxine from 112 to 125 mcg po daily.  Her vitamin D level was 21 ng/mL. I asked her to take vitamin D3 1000 IU po daily.    Since then, her dose has fluctuated with doses ranging from 112 to 125 mcg, and then more recently, TSH was 6.89 on 4/16/19 while on 125 mcg, so I asked her to take an extra 125 mcg one day a week (8 tabs per week instead of 7 tabs). TSH checked 5/18/19 was down to 3.06.     Interval history:  She reports that she has been on the keto diet for a couple of months and has lost 8-9 lbs. She plans to transition to more \"clean\" eating. She feels that some days she is tired and sleepy at work. Otherwise no palpitations or tremors. No heat intolerance.     No recent " history of febrile illness with neck pain or sore throat.     Localized symptoms: there is no throat swelling, trouble swallowing, no SOB when lying flat, and no voice changes.     Family history of thyroid disease: Yes: sister  Family history of thyroid cancer: No  Personal history of high-dose radiation especially in childhood: No    Problem List     Patient Active Problem List   Diagnosis     H/O vitamin D deficiency     CARDIOVASCULAR SCREENING; LDL GOAL LESS THAN 160     Hypothyroidism     Hashimoto's thyroiditis     Encounter for removal and reinsertion of Mireana IUD: Due for removal 2/2024        Medications     Current Outpatient Medications   Medication     levonorgestrel (MIRENA) 20 MCG/24HR IUD     TIROSINT 125 MCG capsule     No current facility-administered medications for this visit.         Allergies     Allergies   Allergen Reactions     Sulfa Drugs      GI issues       Medical / Surgical History     Past Medical History:   Diagnosis Date     ALLERGIC RHINITIS  2000     CHRONIC SINUSITIS      Hashimoto's thyroiditis 10/18/2016     Hypothyroid      NONSPECIF SKIN ERUPT NEC 7/20/2007     SUPERVIS OTHER NORMAL PREG 6/26/2005     Past Surgical History:   Procedure Laterality Date     NO HISTORY OF SURGERY         Social History     Social History     Socioeconomic History     Marital status:      Spouse name: Marky     Number of children: 0     Years of education: 16     Highest education level: Not on file   Occupational History     Occupation: nurse     Employer: [employer]   Social Needs     Financial resource strain: Not on file     Food insecurity:     Worry: Not on file     Inability: Not on file     Transportation needs:     Medical: Not on file     Non-medical: Not on file   Tobacco Use     Smoking status: Never Smoker     Smokeless tobacco: Never Used   Substance and Sexual Activity     Alcohol use: Yes     Alcohol/week: 0.0 oz     Comment: Rare     Drug use: No      Sexual activity: Yes     Partners: Male     Birth control/protection: IUD   Lifestyle     Physical activity:     Days per week: Not on file     Minutes per session: Not on file     Stress: Not on file   Relationships     Social connections:     Talks on phone: Not on file     Gets together: Not on file     Attends Yarsanism service: Not on file     Active member of club or organization: Not on file     Attends meetings of clubs or organizations: Not on file     Relationship status: Not on file     Intimate partner violence:     Fear of current or ex partner: Not on file     Emotionally abused: Not on file     Physically abused: Not on file     Forced sexual activity: Not on file   Other Topics Concern     Parent/sibling w/ CABG, MI or angioplasty before 65F 55M? No   Social History Narrative     Not on file       Family History     Family History   Problem Relation Age of Onset     Hypertension Mother         alive age 59  DM, lipids     Depression Father         alive age 61 PTST     Thyroid Disease Sister         alive age 32     Family History Negative Sister         alive age  30 sinus problems     Depression Sister         alive age  27     Heart Disease Paternal Grandmother              Heart Disease Maternal Grandmother              Thyroid Disease Maternal Grandfather        ROS     Constitutional: no fevers, chills, night sweats. No weight loss/gain. No fatigue. Good appetite  Eyes: no vision changes, no eye redness, no diplopia  Ears, Nose, mouth, throat: no hearing changes, no tinnitus, no rhinorrhea, no nasal congestion, no anosmia  Cardiovascular: no chest pain, no orthopnea or PND, no edema, no palpitations  Respiratory: no dyspnea, no cough, no sputum, no wheezing  Gastrointestinal: no nausea, no vomiting, no abdominal pain, no diarrhea, no constipation  Genitourinary: no dysuria, no frequency, no urgency, no nocturia  Musculoskeletal: no joint pains, no back pain, no cramps, no  "fractures  Skin: no rash, no itching, no dryness, no ulcers, no hair loss, no nail changes  Neurological:no headaches, no weakness, no numbness, no tingling, no tremors, no difficulty sleeping  Psychiatric: no anxiety, no sadness  Hematologic/lymphatic: no easy bruising, no bleeding, no palor    Physical Exam   /66   Pulse 59   Ht 1.74 m (5' 8.5\")   Wt 68 kg (150 lb)   BMI 22.47 kg/m        General: Comfortable, no obvious distress, normal body habitus  Eyes: Sclera anicteric, moist conjunctiva, no lid lag, no exophthalmos  HENT: Atraumatic, oropharynx clear, moist mucous membranes with no mucosal ulcerations  Neck: Trachea midline, supple. Thyroid: Thyroid is normal in size and texture  CV: Regular rhythm, normal rate. No murmurs auscultated  Resp: Clear to auscultation bilaterally, good effort  Abdomen:  Soft, non tender, non distended. Bowel sounds heard. No organomegaly. No striae  Skin: No rashes, lesions, or subcutaneous nodules.   Psych: Alert and oriented x 3. Appropriate affect, good insight  Extremities: No peripheral edema  Musculoskeletal: Appropriate muscle bulk and strength  Lymphatic: No cervical lymphadenopathy  Neuro: Moves all four extremities. No focal deficits on limited exam. Gait normal. No resting tremor, deep tendon reflexes with normal relaxation phase.     Labs/Imaging     Pertinent Labs were reviewed and updated in [facility].  Radiology Results were  reviewed and updated in EPIC.    Summary of recent findings:     TSH   Date Value Ref Range Status   05/18/2019 3.06 0.40 - 4.00 mU/L Final   04/16/2019 6.89 (H) 0.40 - 4.00 mU/L Final   10/19/2018 2.96 0.40 - 4.00 mU/L Final   09/05/2018 2.50 0.40 - 4.00 mU/L Final   07/03/2018 9.36 (H) 0.40 - 4.00 mU/L Final     T4 Free   Date Value Ref Range Status   04/16/2019 0.97 0.76 - 1.46 ng/dL Final   07/03/2018 0.96 0.76 - 1.46 ng/dL Final   01/17/2018 1.01 0.76 - 1.46 ng/dL Final   04/28/2017 1.01 0.76 - 1.46 ng/dL Final   10/18/2016 1.11 0.76 " - 1.46 ng/dL Final       Impression / Plan     1. (E06.3) Hashimoto's thyroiditis  (primary encounter diagnosis)  2. (E03.8,  E06.3) Hypothyroidism due to Hashimoto's thyroiditis  Comment: her dose has fluctuated in recent months from 112 to 125 and now to 142 mcg. TSH is up and down.   This is despite excellent compliance.   Plan:  - switch to Tirosint.   - starting dose 125 mcg daily  - TSH in 6 weeks    3. (R53.83) Fatigue, unspecified type  Comment: It is intermittent.   Plan: trial of Tirosint as per above    4. Weight loss:  We discussed dietary approached. Advised that she watch for and try to reduce animal sources of food. Eat as much unprocessed food.     Follow up: annual.      Kaleigh Griffin MD  Endocrinology, Diabetes and Metabolism  [facility]

## 2019-06-24 ENCOUNTER — MYC MEDICAL ADVICE (OUTPATIENT)
Dept: ENDOCRINOLOGY | Facility: CLINIC | Age: 39
End: 2019-06-24

## 2019-06-24 DIAGNOSIS — E06.3 HYPOTHYROIDISM DUE TO HASHIMOTO'S THYROIDITIS: Primary | ICD-10-CM

## 2019-06-26 RX ORDER — LEVOTHYROXINE SODIUM 125 UG/1
TABLET ORAL
Qty: 102 TABLET | Refills: 3 | Status: SHIPPED | OUTPATIENT
Start: 2019-06-26 | End: 2020-08-07

## 2019-07-10 ENCOUNTER — E-VISIT (OUTPATIENT)
Dept: PEDIATRICS | Facility: CLINIC | Age: 39
End: 2019-07-10
Payer: COMMERCIAL

## 2019-07-10 DIAGNOSIS — Z53.9 ERRONEOUS ENCOUNTER--DISREGARD: Primary | ICD-10-CM

## 2019-07-10 PROCEDURE — 99207 ZZC NO BILLABLE SERVICE THIS VISIT: CPT | Performed by: INTERNAL MEDICINE

## 2019-07-16 NOTE — TELEPHONE ENCOUNTER
Called and talked with Janae and son's eye is getting better.  Eye is a little crusty in the am but getting better.  Wanted to make sure she does not get charged for this e visit, told her she would not be charged.  Routing back to PCP for JENNIFER Beauchamp LPN

## 2019-07-24 DIAGNOSIS — E06.3 HYPOTHYROIDISM DUE TO HASHIMOTO'S THYROIDITIS: ICD-10-CM

## 2019-07-24 LAB
T4 FREE SERPL-MCNC: 1.15 NG/DL (ref 0.76–1.46)
TSH SERPL DL<=0.005 MIU/L-ACNC: 4.54 MU/L (ref 0.4–4)

## 2019-09-24 DIAGNOSIS — E06.3 HYPOTHYROIDISM DUE TO HASHIMOTO'S THYROIDITIS: ICD-10-CM

## 2019-09-24 LAB — TSH SERPL DL<=0.005 MIU/L-ACNC: 1.84 MU/L (ref 0.4–4)

## 2019-10-01 ENCOUNTER — HEALTH MAINTENANCE LETTER (OUTPATIENT)
Age: 39
End: 2019-10-01

## 2019-10-04 ENCOUNTER — HOSPITAL ENCOUNTER (OUTPATIENT)
Dept: MAMMOGRAPHY | Facility: CLINIC | Age: 39
Discharge: HOME | End: 2019-10-04
Attending: INTERNAL MEDICINE | Admitting: INTERNAL MEDICINE
Payer: COMMERCIAL

## 2019-10-04 DIAGNOSIS — Z12.31 VISIT FOR SCREENING MAMMOGRAM: ICD-10-CM

## 2019-10-04 PROCEDURE — 77063 BREAST TOMOSYNTHESIS BI: CPT

## 2019-11-07 ENCOUNTER — TRANSFERRED RECORDS (OUTPATIENT)
Dept: HEALTH INFORMATION MANAGEMENT | Facility: CLINIC | Age: 39
End: 2019-11-07

## 2019-11-13 ENCOUNTER — TRANSFERRED RECORDS (OUTPATIENT)
Dept: HEALTH INFORMATION MANAGEMENT | Facility: CLINIC | Age: 39
End: 2019-11-13

## 2019-11-18 ENCOUNTER — PRE VISIT (OUTPATIENT)
Dept: PEDIATRICS | Facility: CLINIC | Age: 39
End: 2019-11-18

## 2019-11-18 NOTE — TELEPHONE ENCOUNTER
Pre-Visit Planning     Future Appointments   Date Time Provider Department Center   11/19/2019  9:00 AM Jossy Barron MD [facility]   1/6/2020 10:45 AM Jossy Barron MD [facility]     Arrival Time for this Appointment:    Appointment Notes for this encounter:   Data Unavailable    Questionnaires Reviewed/Assigned  No additional questionnaires are needed       Patient preferred phone number: [phone]    Unable to reach. Left voicemail.

## 2019-11-19 ENCOUNTER — OFFICE VISIT (OUTPATIENT)
Dept: PEDIATRICS | Facility: CLINIC | Age: 39
End: 2019-11-19
Payer: COMMERCIAL

## 2019-11-19 VITALS
HEIGHT: 69 IN | SYSTOLIC BLOOD PRESSURE: 90 MMHG | DIASTOLIC BLOOD PRESSURE: 50 MMHG | HEART RATE: 67 BPM | WEIGHT: 148.1 LBS | OXYGEN SATURATION: 99 % | BODY MASS INDEX: 21.94 KG/M2 | TEMPERATURE: 97.3 F

## 2019-11-19 DIAGNOSIS — K60.2 ANAL FISSURE: Primary | ICD-10-CM

## 2019-11-19 PROCEDURE — 99213 OFFICE O/P EST LOW 20 MIN: CPT | Performed by: INTERNAL MEDICINE

## 2019-11-19 ASSESSMENT — MIFFLIN-ST. JEOR: SCORE: 1393.22

## 2019-11-19 NOTE — PATIENT INSTRUCTIONS
miralax 1 capful once to twice daily. Goal is to keep stools soft.  Add metamucil  I'd like you to see Dr. Kimberly Ngo, colon rectal surgeon.

## 2019-11-19 NOTE — PROGRESS NOTES
"Subjective     Janae Stanton is a 39 year old female who presents to clinic today for the following health issues:    HPI   Skin tags in perianal area, had one in 2017 and now another one present for past year, painful with wiping, open and bleed. No blood mixed in with stool, usually only has blood on toilet tissue when she has a stool that is painful to pass.     Patient Active Problem List   Diagnosis     H/O vitamin D deficiency     CARDIOVASCULAR SCREENING; LDL GOAL LESS THAN 160     Hypothyroidism     Hashimoto's thyroiditis     Encounter for removal and reinsertion of Mireana IUD: Due for removal 2024     Past Surgical History:   Procedure Laterality Date     NO HISTORY OF SURGERY         Social History     Tobacco Use     Smoking status: Never Smoker     Smokeless tobacco: Never Used   Substance Use Topics     Alcohol use: Yes     Alcohol/week: 0.0 standard drinks     Comment: Rare     Family History   Problem Relation Age of Onset     Hypertension Mother         alive age 59  DM, lipids     Depression Father         alive age 61 PTST     Thyroid Disease Sister         alive age 32     Family History Negative Sister         alive age  30 sinus problems     Depression Sister         alive age  27     Heart Disease Paternal Grandmother              Heart Disease Maternal Grandmother              Thyroid Disease Maternal Grandfather          Reviewed and updated as needed this visit by Provider         Review of Systems   ROS COMP: GI: NEGATIVE for nausea, abdominal pain, heartburn, or change in bowel habits, no diarrhea or constipation. No blood in stool except as noted above.      Objective    BP 90/50 (Cuff Size: Adult Regular)   Pulse 67   Temp 97.3  F (36.3  C) (Tympanic)   Ht 1.74 m (5' 8.5\")   Wt 67.2 kg (148 lb 1.6 oz)   SpO2 99%   BMI 22.19 kg/m    Body mass index is 22.19 kg/m .  Physical Exam   GENERAL: healthy, alert and no distress  RECTAL (female): sphincter tone " normal, large external anterior hemorrhoid and 2 chronic appearing anal fissures each associated with skin tag along posterior anus.    Diagnostic Test Results:  Labs reviewed in Epic        Assessment & Plan     1. Anal fissure  Reviewed pathyphysiology. Pt info from UpToDate given. Discussed softening stool with miralax and adding fiber supplement. Discussed possible use of topical nifedipine or NTG, but would defer this to colon rectal surgery. Referral done. She will call for appointment.   - COLORECTAL SURGERY REFERRAL       See Patient Instructions    No follow-ups on file.    Jossy Barron MD  [facility]

## 2019-12-11 ENCOUNTER — TRANSFERRED RECORDS (OUTPATIENT)
Dept: HEALTH INFORMATION MANAGEMENT | Facility: CLINIC | Age: 39
End: 2019-12-11

## 2020-01-03 ENCOUNTER — PRE VISIT (OUTPATIENT)
Dept: PEDIATRICS | Facility: CLINIC | Age: 40
End: 2020-01-03

## 2020-01-03 DIAGNOSIS — E06.3 HYPOTHYROIDISM DUE TO HASHIMOTO'S THYROIDITIS: ICD-10-CM

## 2020-01-03 LAB — TSH SERPL DL<=0.005 MIU/L-ACNC: 3.99 MU/L (ref 0.4–4)

## 2020-01-03 NOTE — TELEPHONE ENCOUNTER
"Pre-Visit Planning     Future Appointments   Date Time Provider Department Center   1/6/2020 10:45 AM Jossy Barron MD [facility]   3/9/2020  8:10 AM Jossy Barron MD [facility]     Arrival Time for this Appointment:    Appointment Notes for this encounter:   Data Unavailable    Questionnaires Reviewed/Assigned  No additional questionnaires are needed       Patient preferred phone number: [phone]    Spoke to patient via phone. Patient does not have additional questions or concerns.        Visit is preventive.     Health Maintenance Due   Topic Date Due     ANNUAL REVIEW OF HM ORDERS  1978     PREVENTIVE CARE VISIT  03/23/2017     PHQ-2  01/01/2020     Patient is due for:    No appointment needed.    [employer]  Patient is active on [employer].    Questionnaire Review   Offered information on completing questionnaires via [employer].    Call Summary  \"Thank you for your time today.  If anything comes up before your appointment, please feel free to contact us at [phone].\"  "

## 2020-01-04 ASSESSMENT — ENCOUNTER SYMPTOMS
HEMATURIA: 0
ARTHRALGIAS: 0
ABDOMINAL PAIN: 0
FEVER: 0
WEAKNESS: 0
DIARRHEA: 0
SORE THROAT: 0
DIZZINESS: 0
JOINT SWELLING: 0
HEMATOCHEZIA: 0
BREAST MASS: 0
HEARTBURN: 0
NERVOUS/ANXIOUS: 0
CONSTIPATION: 0
COUGH: 1
MYALGIAS: 0
HEADACHES: 0
SHORTNESS OF BREATH: 0
CHILLS: 0
FREQUENCY: 0
EYE PAIN: 0
PALPITATIONS: 0
PARESTHESIAS: 0
DYSURIA: 0
NAUSEA: 0

## 2020-01-06 ENCOUNTER — OFFICE VISIT (OUTPATIENT)
Dept: PEDIATRICS | Facility: CLINIC | Age: 40
End: 2020-01-06
Payer: COMMERCIAL

## 2020-01-06 VITALS
HEART RATE: 56 BPM | RESPIRATION RATE: 14 BRPM | WEIGHT: 151.9 LBS | BODY MASS INDEX: 23.02 KG/M2 | OXYGEN SATURATION: 100 % | SYSTOLIC BLOOD PRESSURE: 98 MMHG | TEMPERATURE: 97.3 F | HEIGHT: 68 IN | DIASTOLIC BLOOD PRESSURE: 64 MMHG

## 2020-01-06 DIAGNOSIS — N39.41 URGE INCONTINENCE OF URINE: ICD-10-CM

## 2020-01-06 DIAGNOSIS — Z00.00 ROUTINE GENERAL MEDICAL EXAMINATION AT A HEALTH CARE FACILITY: Primary | ICD-10-CM

## 2020-01-06 LAB
ALBUMIN UR-MCNC: NEGATIVE MG/DL
APPEARANCE UR: CLEAR
BILIRUB UR QL STRIP: NEGATIVE
COLOR UR AUTO: YELLOW
GLUCOSE UR STRIP-MCNC: NEGATIVE MG/DL
HGB UR QL STRIP: ABNORMAL
KETONES UR STRIP-MCNC: NEGATIVE MG/DL
LEUKOCYTE ESTERASE UR QL STRIP: NEGATIVE
NITRATE UR QL: NEGATIVE
NON-SQ EPI CELLS #/AREA URNS LPF: NORMAL /LPF
PH UR STRIP: 6 PH (ref 5–7)
RBC #/AREA URNS AUTO: NORMAL /HPF
SOURCE: ABNORMAL
SP GR UR STRIP: 1.01 (ref 1–1.03)
UROBILINOGEN UR STRIP-ACNC: 0.2 EU/DL (ref 0.2–1)
WBC #/AREA URNS AUTO: NORMAL /HPF

## 2020-01-06 PROCEDURE — 99396 PREV VISIT EST AGE 40-64: CPT | Performed by: INTERNAL MEDICINE

## 2020-01-06 PROCEDURE — 81001 URINALYSIS AUTO W/SCOPE: CPT | Performed by: INTERNAL MEDICINE

## 2020-01-06 ASSESSMENT — ENCOUNTER SYMPTOMS
BREAST MASS: 0
SHORTNESS OF BREATH: 0
JOINT SWELLING: 0
COUGH: 1
DIARRHEA: 0
MYALGIAS: 0
HEMATOCHEZIA: 0
DYSURIA: 0
PALPITATIONS: 0
HEARTBURN: 0
FREQUENCY: 0
NAUSEA: 0
HEADACHES: 0
NERVOUS/ANXIOUS: 0
CONSTIPATION: 0
SORE THROAT: 0
FEVER: 0
EYE PAIN: 0
DIZZINESS: 0
ARTHRALGIAS: 0
WEAKNESS: 0
ABDOMINAL PAIN: 0
PARESTHESIAS: 0
CHILLS: 0
HEMATURIA: 0

## 2020-01-06 ASSESSMENT — MIFFLIN-ST. JEOR: SCORE: 1402.51

## 2020-01-06 NOTE — PROGRESS NOTES
SUBJECTIVE:   CC: Janae Stanton is an 39 year old woman who presents for preventive health visit.     Healthy Habits:     Getting at least 3 servings of Calcium per day:  Yes    Bi-annual eye exam:  Yes    Dental care twice a year:  Yes    Sleep apnea or symptoms of sleep apnea:  Daytime drowsiness    Diet:  Other    Frequency of exercise:  1 day/week    Duration of exercise:  Less than 15 minutes    Taking medications regularly:  Yes    Medication side effects:  Not applicable    PHQ-2 Total Score: 1    Additional concerns today:  Yes    Questions about menopause today. Occasionally gets sweaty at night. No daytime hot flashes.    Working with her endocrinologist on her thyroid dosing. Has been noting sleepiness which seems intermittent. Not sure if really related to elevated TSH, or is independent.      Today's PHQ-2 Score:   PHQ-2 ( 1999 [employer]) 1/4/2020   Q1: Little interest or pleasure in doing things 0   Q2: Feeling down, depressed or hopeless 1   PHQ-2 Score 1   Q1: Little interest or pleasure in doing things Not at all   Q2: Feeling down, depressed or hopeless Several days   PHQ-2 Score 1       Abuse: Current or Past(Physical, Sexual or Emotional)- No  Do you feel safe in your environment? Yes        Social History     Tobacco Use     Smoking status: Never Smoker     Smokeless tobacco: Never Used   Substance Use Topics     Alcohol use: Yes     Alcohol/week: 0.0 standard drinks     Comment: Rare         Alcohol Use 1/4/2020   Prescreen: >3 drinks/day or >7 drinks/week? No   Prescreen: >3 drinks/day or >7 drinks/week? -       Reviewed orders with patient.  Reviewed health maintenance and updated orders accordingly - Yes  Lab work is in process  Labs reviewed in [facility]    Mammogram Screening: Patient under age 50, mutual decision reflected in health maintenance.      Pertinent mammograms are reviewed under the imaging tab.  History of abnormal Pap smear: NO - age 30- 65 PAP every 3 years recommended  PAP /  "HPV Latest Ref Rng & Units 3/1/2019 3/23/2016 8/3/2006   PAP - NIL NIL NIL   HPV 16 DNA NEG:Negative Negative - -   HPV 18 DNA NEG:Negative Negative - -   OTHER HR HPV NEG:Negative Negative - -     Reviewed and updated as needed this visit by clinical staff  Tobacco  Allergies  Med Hx  Surg Hx  Fam Hx  Soc Hx         Reviewed and updated as needed this visit by Provider            Review of Systems   Constitutional: Negative for chills and fever.   HENT: Negative for congestion, ear pain, hearing loss and sore throat.    Eyes: Negative for pain and visual disturbance.   Respiratory: Positive for cough. Negative for shortness of breath.    Cardiovascular: Negative for chest pain, palpitations and peripheral edema.   Gastrointestinal: Negative for abdominal pain, constipation, diarrhea, heartburn, hematochezia and nausea.   Breasts:  Negative for tenderness, breast mass and discharge.   Genitourinary: Positive for urgency. Negative for dysuria, frequency, genital sores, hematuria, pelvic pain, vaginal bleeding and vaginal discharge.   Musculoskeletal: Negative for arthralgias, joint swelling and myalgias.   Skin: Negative for rash.   Neurological: Negative for dizziness, weakness, headaches and paresthesias.   Psychiatric/Behavioral: Positive for mood changes. The patient is not nervous/anxious.           OBJECTIVE:   BP 98/64 (Cuff Size: Adult Regular)   Pulse 56   Temp 97.3  F (36.3  C) (Tympanic)   Resp 14   Ht 1.727 m (5' 8\")   Wt 68.9 kg (151 lb 14.4 oz)   SpO2 100%   BMI 23.10 kg/m    Physical Exam  GENERAL: healthy, alert and no distress  EYES: Eyes grossly normal to inspection, PERRL and conjunctivae and sclerae normal  HENT: ear canals and TM's normal, nose and mouth without ulcers or lesions  NECK: no adenopathy, no asymmetry, masses, or scars and thyroid normal to palpation  RESP: lungs clear to auscultation - no rales, rhonchi or wheezes  BREAST: normal without masses, tenderness or nipple " "discharge and no palpable axillary masses or adenopathy  CV: regular rate and rhythm, normal S1 S2, no S3 or S4, no murmur, click or rub, no peripheral edema and peripheral pulses strong  ABDOMEN: soft, nontender, no hepatosplenomegaly, no masses and bowel sounds normal  MS: no gross musculoskeletal defects noted, no edema  SKIN: no suspicious lesions or rashes  NEURO: Normal strength and tone, mentation intact and speech normal  PSYCH: mentation appears normal, affect normal/bright    Diagnostic Test Results:  Labs reviewed in Epic    ASSESSMENT/PLAN:   1. Routine general medical examination at a health care facility    - Urine Microscopic    2. Urge incontinence of urine  Rare episodes. Normal UA today. Discussed monitoring, avoiding bladder irritants. Option for bladder spasm medication, if needed. She will let me know if episodes increase.   - UA reflex to Microscopic and Culture    COUNSELING:  Reviewed preventive health counseling, as reflected in patient instructions    Estimated body mass index is 23.1 kg/m  as calculated from the following:    Height as of this encounter: 1.727 m (5' 8\").    Weight as of this encounter: 68.9 kg (151 lb 14.4 oz).         reports that she has never smoked. She has never used smokeless tobacco.      Counseling Resources:  ATP IV Guidelines  Pooled Cohorts Equation Calculator  Breast Cancer Risk Calculator  FRAX Risk Assessment  ICSI Preventive Guidelines  Dietary Guidelines for Americans, 2010  USDA's MyPlate  ASA Prophylaxis  Lung CA Screening    Jossy Barron MD  [facility]  "

## 2020-01-06 NOTE — PATIENT INSTRUCTIONS
Preventive Health Recommendations  Female Ages 40 to 49    Yearly exam:     See your health care provider every year in order to  1. Review health changes.   2. Discuss preventive care.    3. Review your medicines if your doctor prescribed any.      Get a Pap test every three years (unless you have an abnormal result and your provider advises testing more often).      If you get Pap tests with HPV test, you only need to test every 5 years, unless you have an abnormal result. You do not need a Pap test if your uterus was removed (hysterectomy) and you have not had cancer.      You should be tested each year for STDs (sexually transmitted diseases), if you're at risk.     Ask your doctor if you should have a mammogram.      Have a colonoscopy (test for colon cancer) if someone in your family has had colon cancer or polyps before age 50.       Have a cholesterol test every 5 years.       Have a diabetes test (fasting glucose) after age 45. If you are at risk for diabetes, you should have this test every 3 years.    Shots: Get a flu shot each year. Get a tetanus shot every 10 years.     Nutrition:     Eat at least 5 servings of fruits and vegetables each day.    Eat whole-grain bread, whole-wheat pasta and brown rice instead of white grains and rice.    Get adequate Calcium and Vitamin D.      Lifestyle    Exercise at least 150 minutes a week (an average of 30 minutes a day, 5 days a week). This will help you control your weight and prevent disease.    Limit alcohol to one drink per day.    No smoking.     Wear sunscreen to prevent skin cancer.    See your dentist every six months for an exam and cleaning.    CALCIUM    Recommendations:  Teenagers and premenopausal women: 1200 mg/day  Pregnant and Lactating women: 1500 mg/day  Men and Postmenopausal women on estrogen: 1200mg/day  Postmenopausal women not on estrogen: 1500 mg/day    If you are not eating dairy products you also need 800-2000 IU of vitamin D per day  "which can be obtained in either a multivitamin or in some of the Calcium tablets.    Dietary sources  Milk                            8 oz            300 mg  Yogurt                          1 cup           400 mg  Hard cheese                     1.5 oz          300 mg  Cottage cheese                  2 cup           300 mg  Orange juice with Calcium       8 oz            300 mg  Low fat dairy sources are recommended    Calcium citrate with vitamin D is often better tolerated than calcium carbonate, which can cause constipation.    With fish oil, look for 650 mg DHA/EPA    Patient Education   Tips for Sleep Hygiene  \"Sleep hygiene\" means having good sleep habits.Follow these tips to sleep better at night:     Get on a schedule. Go to bed and get up at about the same time every day.    Listen to your body. Only try to sleep when you actually feel tired or sleepy.    Be patient. If you haven't been able to get to sleep after about 30 minutes or more, get up and do something calming or boring until you feel sleepy. Then return to bed and try again.    Don't have caffeine (coffee, tea, cola drinks, chocolate and some medicines), alcohol or nicotine (cigarettes). These can make it harder for you to fall asleep and stay asleep.    Use your bed for sleeping only. That means no TV, computer or homework in bed, especially during the evening and before bedtime.    Don't nap during the day. If you must nap, make sure it is for less than 20 minutes.    Create sleep rituals that remind your body it is time to sleep. Examples include breathing exercises, stretching or reading a book.    Avoid all electronic media (smart phone, computer, tablet) within 2 hours of bed time. The \"blue light\" in these devices activates the part of the brain that keeps you awake.    Dim the lights at night.    Get early morning sources of light (walk in the sunshine) to help set sleep patterns at night.    Try a bath or shower before bed. Having a warm " "bath 1 to 2 hours before bedtime can help you feel sleepy. Hot baths can make you alert, so be mindful of the temperature.    Don't watch the clock. Checking the clock during the night can wake you up. It can also lead to negative thoughts such as, \"I will never fall asleep,\" which can increase anxiety and sleeplessness.    Use a sleep diary. Track your sleep schedule to know your sleep patterns and to see where you can improve.    Get regular exercise every day. Try not to do heavy exercise in the 4 hours before bedtime.    Eat a healthy, balanced diet.    Try eating a light, healthy snack before bed, but avoid eating a heavy meal.    Create the right sleeping area. A cool, dark, quiet room is best. If needed, try earplugs, fans and blackout curtains.    Keep your daytime routine the same even if you have a bad night sleep. Avoiding activities the next day can make it harder to sleep.  For informational purposes only. Not to replace the advice of your health care provider.   Copyright   2013 [facility]. All rights reserved. [employer] [id number] - 01/16.       I do recommend a goal of 8 hours of sleep a night.   If you still feel unrefreshed when you wake up, let me know. We can think about a sleep evaluation.    Keep track of your fatigue symptoms. See if we can find a pattern.     Try to watch for foods that may trigger your bladder issues.  "

## 2020-01-17 ENCOUNTER — TRANSFERRED RECORDS (OUTPATIENT)
Dept: HEALTH INFORMATION MANAGEMENT | Facility: CLINIC | Age: 40
End: 2020-01-17

## 2020-01-20 ENCOUNTER — MYC MEDICAL ADVICE (OUTPATIENT)
Dept: PEDIATRICS | Facility: CLINIC | Age: 40
End: 2020-01-20

## 2020-08-05 DIAGNOSIS — E06.3 HYPOTHYROIDISM DUE TO HASHIMOTO'S THYROIDITIS: ICD-10-CM

## 2020-08-05 PROCEDURE — 84443 ASSAY THYROID STIM HORMONE: CPT | Performed by: INTERNAL MEDICINE

## 2020-08-05 PROCEDURE — 36415 COLL VENOUS BLD VENIPUNCTURE: CPT | Performed by: INTERNAL MEDICINE

## 2020-08-06 ENCOUNTER — OFFICE VISIT (OUTPATIENT)
Dept: URGENT CARE | Facility: URGENT CARE | Age: 40
End: 2020-08-06
Payer: COMMERCIAL

## 2020-08-06 ENCOUNTER — MYC MEDICAL ADVICE (OUTPATIENT)
Dept: ENDOCRINOLOGY | Facility: CLINIC | Age: 40
End: 2020-08-06

## 2020-08-06 VITALS
BODY MASS INDEX: 23.11 KG/M2 | SYSTOLIC BLOOD PRESSURE: 103 MMHG | DIASTOLIC BLOOD PRESSURE: 67 MMHG | WEIGHT: 153.1 LBS | HEART RATE: 83 BPM | TEMPERATURE: 98.6 F | OXYGEN SATURATION: 99 %

## 2020-08-06 DIAGNOSIS — E06.3 HYPOTHYROIDISM DUE TO HASHIMOTO'S THYROIDITIS: Primary | ICD-10-CM

## 2020-08-06 DIAGNOSIS — R30.0 DYSURIA: ICD-10-CM

## 2020-08-06 DIAGNOSIS — N30.01 ACUTE CYSTITIS WITH HEMATURIA: Primary | ICD-10-CM

## 2020-08-06 LAB
ALBUMIN UR-MCNC: >=300 MG/DL
APPEARANCE UR: ABNORMAL
BACTERIA #/AREA URNS HPF: ABNORMAL /HPF
BILIRUB UR QL STRIP: ABNORMAL
COLOR UR AUTO: ABNORMAL
GLUCOSE UR STRIP-MCNC: NEGATIVE MG/DL
HCG UR QL: NEGATIVE
HGB UR QL STRIP: ABNORMAL
KETONES UR STRIP-MCNC: 40 MG/DL
LEUKOCYTE ESTERASE UR QL STRIP: ABNORMAL
NITRATE UR QL: NEGATIVE
NON-SQ EPI CELLS #/AREA URNS LPF: ABNORMAL /LPF
PH UR STRIP: 6 PH (ref 5–7)
RBC #/AREA URNS AUTO: >100 /HPF
SOURCE: ABNORMAL
SP GR UR STRIP: 1.02 (ref 1–1.03)
SPECIMEN SOURCE: NORMAL
TSH SERPL DL<=0.005 MIU/L-ACNC: 0.98 MU/L (ref 0.4–4)
UROBILINOGEN UR STRIP-ACNC: 2 EU/DL (ref 0.2–1)
WBC #/AREA URNS AUTO: >100 /HPF
WET PREP SPEC: NORMAL

## 2020-08-06 PROCEDURE — 87088 URINE BACTERIA CULTURE: CPT | Performed by: FAMILY MEDICINE

## 2020-08-06 PROCEDURE — 81001 URINALYSIS AUTO W/SCOPE: CPT | Performed by: FAMILY MEDICINE

## 2020-08-06 PROCEDURE — 99213 OFFICE O/P EST LOW 20 MIN: CPT | Performed by: PHYSICIAN ASSISTANT

## 2020-08-06 PROCEDURE — 87086 URINE CULTURE/COLONY COUNT: CPT | Performed by: FAMILY MEDICINE

## 2020-08-06 PROCEDURE — 81025 URINE PREGNANCY TEST: CPT | Performed by: PHYSICIAN ASSISTANT

## 2020-08-06 PROCEDURE — 87186 SC STD MICRODIL/AGAR DIL: CPT | Performed by: FAMILY MEDICINE

## 2020-08-06 PROCEDURE — 87210 SMEAR WET MOUNT SALINE/INK: CPT | Performed by: FAMILY MEDICINE

## 2020-08-06 RX ORDER — NITROFURANTOIN 25; 75 MG/1; MG/1
100 CAPSULE ORAL 2 TIMES DAILY
Qty: 14 CAPSULE | Refills: 0 | Status: SHIPPED | OUTPATIENT
Start: 2020-08-06 | End: 2020-08-06

## 2020-08-06 RX ORDER — NITROFURANTOIN 25; 75 MG/1; MG/1
100 CAPSULE ORAL 2 TIMES DAILY
Qty: 14 CAPSULE | Refills: 0 | Status: SHIPPED | OUTPATIENT
Start: 2020-08-06 | End: 2020-08-13

## 2020-08-06 NOTE — PROGRESS NOTES
Janae SHANKAR Anahiperlajosepresents to clinic today c/o 2 weeks dysuria, urinary urgency/frequency and suprapubic area pressure. Patient notes cloudy urine approximately one week.      Last UTI approximately 15 years ago.  No hx of resistant infection, kidney stones, or kidney infections.      Red Flag Review: Denies any abdominal pain or flank pain. Denies any F/C/N/V/D or other acute sxs.      ROS:     CONSITUTIONAL: Denies any fever, chills or acute onset weakness  CARDIAC: Denies any CP or SOB  RESP: Reginald any cough, CP or SOB    GI: Denies any abdominal pain. Denies any F/C/N/V/D.   GYN: . [id number]. One  Miscarriage. Mirena IUD. Patient states she has had a monthly cycle with Mirena for years. Patient missed her menses in June (states that is unusual). She took a home pregnancy test (states negative) in June. Patient reports she has had vaginal spotting since end of July.  (different for her).She has also been having  On/off pelvic discomfort and has an appointment with PCP Monday.   SKIN: Denies rash   NEURO: Denies any confusion or mental status changes      Past Medical History:   Diagnosis Date     ALLERGIC RHINITIS  2000     CHRONIC SINUSITIS      Hashimoto's thyroiditis 10/18/2016     Hypothyroid      NONSPECIF SKIN ERUPT NEC 7/20/2007     SUPERVIS OTHER NORMAL PREG 6/26/2005       Current Outpatient Medications   Medication     cholecalciferol 25 MCG (1000 UT) TABS     levonorgestrel (MIRENA) 20 MCG/24HR IUD     levothyroxine (SYNTHROID/LEVOTHROID) 125 MCG tablet     No current facility-administered medications for this visit.        Allergies   Allergen Reactions     Sulfa Drugs      GI issues         OBJECTIVE:  /67   Pulse 83   Temp 98.6  F (37  C)   Wt 69.4 kg (153 lb 1.6 oz)   SpO2 99%   BMI 23.11 kg/m          GENERAL:  Very pleasant, comfortable and generally well appearing.  SKIN: No rashes.  Normal color.  Sclera clear.  CARDIAC:NORMAL - regular rate and rhythm without murmur., normal  s1/s2 and without extra heart sounds  RESP: Normal - CTA without rales, rhonchi, or wheezing.  ABDOMEN:  Soft, non-tender, non-distended.  Positive normal bowel sounds.  No HSM or masses.  Positive, mild, suprapubic tenderness.  No CVA tenderness.  NEURO: Alert and oriented.  Normal speech and mentation.  CN II/XII grossly intact.  Gait within normal limits.      Component      Latest Ref Rng & Units 8/6/2020   Color Urine       Red   Appearance Urine       Turbid   Glucose Urine      NEG:Negative mg/dL Negative   Bilirubin Urine      NEG:Negative Moderate (A)   Ketones Urine      NEG:Negative mg/dL 40 (A)   Specific Gravity Urine      1.003 - 1.035 1.025   Blood Urine      NEG:Negative Large (A)   pH Urine      5.0 - 7.0 pH 6.0   Protein Albumin Urine      NEG:Negative mg/dL >=300 (A)   Urobilinogen Urine      0.2 - 1.0 EU/dL 2.0 (H)   Nitrite Urine      NEG:Negative Negative   Leukocyte Esterase Urine      NEG:Negative Large (A)   Source       Midstream Urine   WBC Urine      OTO5:0 - 5 /HPF >100 (A)   RBC Urine      OTO2:O - 2 /HPF >100 (A)   Squamous Epithelial /LPF Urine      FEW:Few /LPF Few   Bacteria Urine      NEG:Negative /HPF Few (A)     HCG: Negaive     ASSESSMENT/PLAN:    (N30.01) Acute cystitis with hematuria  Plan: nitroFURantoin macrocrystal-monohydrate         (MACROBID) 100 MG capsule    August 6, 2020  Urgent Care Plan:     1. Push plain, non-carbonated water.      2.  Start the antibiotic I prescribed to treat your bladder infection     3.  Take the  full course of antibiotic as prescribed.     4. Follow-up with your primary care provider sooner if your symptoms do not improve within 3 days of starting your antibiotic.     5. Keep your appointment for Monday regarding your GYN concerns     6.. Go to the emergency room if you have sudden, severe, worsening of your symptoms (such as high fever, shaking chills, back pain, abdominal pain, nausea, vomiting, weakness).       (R30.0) Dysuria  (primary  encounter diagnosis)  Plan: *UA reflex to Microscopic and Culture ([place]         and [place] (except [place] and         [place]), Wet prep, Urine Microscopic, Urine         Culture Aerobic Bacterial, nitroFURantoin         macrocrystal-monohydrate (MACROBID) 100 MG         capsule, HCG Qual, Urine ([id number])

## 2020-08-06 NOTE — PATIENT INSTRUCTIONS
"  Patient Education     Bladder Infection, Female (Adult)    Urine is normally doesn't have any bacteria in it. But bacteria can get into the urinary tract from the skin around the rectum. Or they can travel in the blood from elsewhere in the body. Once they are in your urinary tract, they can cause infection in the urethra (urethritis), the bladder (cystitis), or the kidneys (pyelonephritis).  The most common place for an infection is in the bladder. This is called a bladder infection. This is one of the most common infections in women. Most bladder infections are easily treated. They are not serious unless the infection spreads to the kidney.  The phrases \"bladder infection,\" \"UTI,\" and \"cystitis\" are often used to describe the same thing. But they are not always the same. Cystitis is an inflammation of the bladder. The most common cause of cystitis is an infection.  Symptoms  The infection causes inflammation in the urethra and bladder. This causes many of the symptoms. The most common symptoms of a bladder infection are:    Pain or burning when urinating    Having to urinate more often than usual    Urgent need to urinate    Only a small amount of urine comes out    Blood in urine    Abdominal discomfort. This is usually in the lower abdomen above the pubic bone.    Cloudy urine    Strong- or bad-smelling urine    Unable to urinate (urinary retention)    Unable to hold urine in (urinary incontinence)    Fever    Loss of appetite    Confusion (in older adults)  Causes  Bladder infections are not contagious. You can't get one from someone else, from a toilet seat, or from sharing a bath.  The most common cause of bladder infections is bacteria from the bowels. The bacteria get onto the skin around the opening of the urethra. From there, they can get into the urine and travel up to the bladder, causing inflammation and infection. This usually happens because of:    Wiping improperly after urinating. Always wipe " from front to back.    Bowel incontinence    Pregnancy    Procedures such as having a catheter inserted    Older age    Not emptying your bladder. This can allow bacteria a chance to grow in your urine.    Dehydration    Constipation    Sex    Use of a diaphragm for birth control   Treatment  Bladder infections are diagnosed by a urine test. They are treated with antibiotics and usually clear up quickly without complications. Treatment helps prevent a more serious kidney infection.  Medicines  Medicines can help in the treatment of a bladder infection:    Take antibiotics until they are used up, even if you feel better. It is important to finish them to make sure the infection has cleared.    You can use acetaminophen or ibuprofen for pain, fever, or discomfort, unless another medicine was prescribed. If you have chronic liver or kidney disease, talk with your healthcare provider before using these medicines. Also talk with your provider if you've ever had a stomach ulcer or gastrointestinal bleeding, or are taking blood-thinner medicines.    If you are given phenazopydridine to reduce burning with urination, it will cause your urine to become a bright orange color. This can stain clothing.  Care and prevention  These self-care steps can help prevent future infections:    Drink plenty of fluids to prevent dehydration and flush out your bladder. Do this unless you must restrict fluids for other health reasons, or your doctor told you not to.    Proper cleaning after going to the bathroom is important. Wipe from front to back after using the toilet to prevent the spread of bacteria.    Urinate more often. Don't try to hold urine in for a long time.    Wear loose-fitting clothes and cotton underwear. Avoid tight-fitting pants.    Improve your diet and prevent constipation. Eat more fresh fruit and vegetables, and fiber, and less junk and fatty foods.    Avoid sex until your symptoms are gone.    Avoid caffeine,  alcohol, and spicy foods. These can irritate your bladder.    Urinate right after intercourse to flush out your bladder.    If you use birth control pills and have frequent bladder infections, discuss it with your doctor.  Follow-up care  Call your healthcare provider if all symptoms are not gone after 3 days of treatment. This is especially important if you have repeat infections.  If a culture was done, you will be told if your treatment needs to be changed. If directed, you can call to find out the results.  If X-rays were done, you will be told if the results will affect your treatment.  Call 911  Call 911 if any of the following occur:    Trouble breathing    Hard to wake up or confusion    Fainting or loss of consciousness    Rapid heart rate  When to seek medical advice  Call your healthcare provider right away if any of these occur:    Fever of 100.4 F (38.0 C) or higher, or as directed by your healthcare provider    Symptoms are not better by the third day of treatment    Back or belly (abdominal) pain that gets worse    Repeated vomiting, or unable to keep medicine down    Weakness or dizziness    Vaginal discharge    Pain, redness, or swelling in the outer vaginal area (labia)  Date Last Reviewed: 10/1/2016    0229-2797 [employer]. [address] [place] [address]. All rights reserved. This information is not intended as a substitute for professional medical care. Always follow your healthcare professional's instructions.           August 6, 2020  Urgent Care Plan:     1. Push plain, non-carbonated water.      2.  Start the antibiotic I prescribed to treat your bladder infection     3.  Take the  full course of antibiotic as prescribed.     4. Follow-up with your primary care provider sooner if your symptoms do not improve within 3 days of starting your antibiotic.     5. Keep your appointment for Monday regarding your GYN concerns     6.. Go to the emergency room if you have  sudden, severe, worsening of your symptoms (such as high fever, shaking chills, back pain, abdominal pain, nausea, vomiting, weakness).

## 2020-08-07 RX ORDER — LEVOTHYROXINE SODIUM 125 UG/1
TABLET ORAL
Qty: 102 TABLET | Refills: 3 | Status: SHIPPED | OUTPATIENT
Start: 2020-08-07 | End: 2021-10-11

## 2020-08-08 LAB
BACTERIA SPEC CULT: ABNORMAL
SPECIMEN SOURCE: ABNORMAL

## 2020-08-10 ENCOUNTER — MYC MEDICAL ADVICE (OUTPATIENT)
Dept: PEDIATRICS | Facility: CLINIC | Age: 40
End: 2020-08-10

## 2020-08-10 ENCOUNTER — TELEPHONE (OUTPATIENT)
Dept: PEDIATRICS | Facility: CLINIC | Age: 40
End: 2020-08-10

## 2020-08-10 ENCOUNTER — VIRTUAL VISIT (OUTPATIENT)
Dept: PEDIATRICS | Facility: CLINIC | Age: 40
End: 2020-08-10
Payer: COMMERCIAL

## 2020-08-10 DIAGNOSIS — N76.0 VAGINITIS AND VULVOVAGINITIS: ICD-10-CM

## 2020-08-10 DIAGNOSIS — N30.91 HEMORRHAGIC CYSTITIS: Primary | ICD-10-CM

## 2020-08-10 DIAGNOSIS — R10.2 PELVIC PAIN IN FEMALE: ICD-10-CM

## 2020-08-10 DIAGNOSIS — N92.0 SPOTTING BETWEEN MENSES: ICD-10-CM

## 2020-08-10 PROCEDURE — 99214 OFFICE O/P EST MOD 30 MIN: CPT | Mod: GT | Performed by: INTERNAL MEDICINE

## 2020-08-10 RX ORDER — METRONIDAZOLE 500 MG/1
500 TABLET ORAL 2 TIMES DAILY
Qty: 14 TABLET | Refills: 0 | Status: SHIPPED | OUTPATIENT
Start: 2020-08-10 | End: 2020-08-17

## 2020-08-10 NOTE — PROGRESS NOTES
"Janae Stanton is a 40 year old female who is being evaluated via a billable video visit.      The patient has been notified of following:     \"This video visit will be conducted via a call between you and your physician/provider. We have found that certain health care needs can be provided without the need for an in-person physical exam.  This service lets us provide the care you need with a video conversation.  If a prescription is necessary we can send it directly to your pharmacy.  If lab work is needed we can place an order for that and you can then stop by our lab to have the test done at a later time.    Video visits are billed at different rates depending on your insurance coverage.  Please reach out to your insurance provider with any questions.    If during the course of the call the physician/provider feels a video visit is not appropriate, you will not be charged for this service.\"    Patient has given verbal consent for Video visit? Yes  How would you like to obtain your AVS? MyChart  If you are dropped from the video visit, the video invite should be resent to: Text to cell phone: [phone]  Will anyone else be joining your video visit? No    Subjective     Janae Stanton is a 40 year old female who presents today via video visit for the following health issues:    HPI    Went to  on 8/6 and is feeling much better. Pain and symptoms better a few hours after she took the first dose. Urine is still slightly pink.  Didn't have any prior symptoms, no dysuria. Does have chronic urinary urgency since pregnancy.    Also has been having spotting. First time she noticed was in June. Has Mirena. Has always had a light period every month since starting Mirena 8 years ago. Skipped a period in June. On July 11, had dark brown spotting with an odor. Unusual for her. On July 25, she started having crampy pain in rectovaginal area. Has had for at least 3-4 years, random, felt like an internal cramp in rectal " area. Did a self check and pressed on the posterior wall of vagina and that was tender. Hasn't had since. Generally this is random, it happens, after 1-2 hours it's done and goes away. This last episode was different because it was more severe and she had to stop running and had to double over.   Early August she had some anterior pelvic pain. Malodorous vaginal discharge. Odor doesn't seem as intense since going on antibiotics, but still present. Spotting every day.    Video Start Time: 8:18 AM    Patient Active Problem List   Diagnosis     H/O vitamin D deficiency     CARDIOVASCULAR SCREENING; LDL GOAL LESS THAN 160     Hypothyroidism     Hashimoto's thyroiditis     Encounter for removal and reinsertion of Mireana IUD: Due for removal 2024     Past Surgical History:   Procedure Laterality Date     NO HISTORY OF SURGERY         Social History     Tobacco Use     Smoking status: Never Smoker     Smokeless tobacco: Never Used   Substance Use Topics     Alcohol use: Yes     Alcohol/week: 0.0 standard drinks     Comment: Rare     Family History   Problem Relation Age of Onset     Hypertension Mother         alive age 59  DM, lipids     Depression Father         alive age 61 PTST     Thyroid Disease Sister         alive age 32     Family History Negative Sister         alive age  30 sinus problems     Depression Sister         alive age  27     Heart Disease Paternal Grandmother              Heart Disease Maternal Grandmother              Thyroid Disease Maternal Grandfather          Current Outpatient Medications   Medication Sig Dispense Refill     metroNIDAZOLE (FLAGYL) 500 MG tablet Take 1 tablet (500 mg) by mouth 2 times daily for 7 days 14 tablet 0     cholecalciferol 25 MCG (1000 UT) TABS Take 1 tablet by mouth daily 90 tablet 0     levonorgestrel (MIRENA) 20 MCG/24HR IUD 1 each (20 mcg) by Intrauterine route once for 1 dose Started 2013 1 each 0     levothyroxine (SYNTHROID/LEVOTHROID) 125  MCG tablet Take 1 tablet daily 6 days a week, and 2 tablets every 7th day for total of 8 tabs per week 102 tablet 3     nitroFURantoin macrocrystal-monohydrate (MACROBID) 100 MG capsule Take 1 capsule (100 mg) by mouth 2 times daily for 7 days 14 capsule 0       Reviewed and updated as needed this visit by Provider         Review of Systems   Constitutional, HEENT, cardiovascular, pulmonary, gi and gu systems are negative, except as otherwise noted.      Objective             Physical Exam     GENERAL: Healthy, alert and no distress  EYES: Eyes grossly normal to inspection.  No discharge or erythema, or obvious scleral/conjunctival abnormalities.  RESP: No audible wheeze, cough, or visible cyanosis.  No visible retractions or increased work of breathing.    SKIN: Visible skin clear. No significant rash, abnormal pigmentation or lesions.  NEURO: Cranial nerves grossly intact.  Mentation and speech appropriate for age.  PSYCH: Mentation appears normal, affect normal/bright, judgement and insight intact, normal speech and appearance well-groomed.      Diagnostic Test Results:  Labs reviewed in Epic        Assessment & Plan     1. Hemorrhagic cystitis  Symptoms resolving after 4 days on macrobid, will continue 7 day course. Prior UA without RBC's, trace blood. Didn't have dysuria, only hematuria and urgency. Plan repeat UA when she is in clinic next.  - *UA reflex to Microscopic and Culture ([place] [facility] (except [place] and [place]); Future    2. Pelvic pain in female  Has had brief intermittent pelvic pain for at least 3-4 years, likely longer. Had a severe episode last month that lasted 1-2 hours and was associated with tenderness of the posterior vaginal wall. She didn't feel anything prolapsing. (she is a nurse). Also has had spotting for the last month now daily. Has IUD in place. I think we need to do imaging to evaluate IUD placement, would consider pelvic US, but her rectovaginal pain has me  concerned and am considering CT pelvis. I will reach out to her gyn or radiology and order a test, then would like her to be seen by Dr. Allen in follow up.     3. Vaginitis and vulvovaginitis  Very unusual for her and disturbing odor. Plan course of flagyl, possible false negative wet prep in UC. She will let me know if her odor/symptoms don't resolve.  - metroNIDAZOLE (FLAGYL) 500 MG tablet; Take 1 tablet (500 mg) by mouth 2 times daily for 7 days  Dispense: 14 tablet; Refill: 0       4. Spotting between menses  This has not happened with her Mirena before. Will check placement. Follow up gyn.      No follow-ups on file.    Jossy Barron MD  [facility]      Video-Visit Details    Type of service:  Video Visit    Video End Time:8:44 AM    Originating Location (pt. Location): Home    [facility]     Platform used for Video Visit: Paulina    No follow-ups on file.       Jossy Barron MD

## 2020-08-10 NOTE — TELEPHONE ENCOUNTER
Forwarding to Dr. Allen and gyn scheduling pool. I saw Janae this morning for video visit. I'm considering CT pelvis or abd/pelvis vs pelvic US. I'm just concerned about vaginal bleeding with odor, but also rectovaginal crampy pain. Dr. Allen, do you have a preference, then I will order?   **Also to scheduling to schedule visit after imaging.   Jossy Barron M.D.

## 2020-08-13 ENCOUNTER — MYC MEDICAL ADVICE (OUTPATIENT)
Dept: PEDIATRICS | Facility: CLINIC | Age: 40
End: 2020-08-13

## 2020-08-13 DIAGNOSIS — N30.91 HEMORRHAGIC CYSTITIS: Primary | ICD-10-CM

## 2020-08-14 NOTE — TELEPHONE ENCOUNTER
Routing to PCP:    Please see below.     1. Hemorrhagic cystitis  Symptoms resolving after 4 days on macrobid, will continue 7 day course. Prior UA without RBC's, trace blood. Didn't have dysuria, only hematuria and urgency. Plan repeat UA when she is in clinic next.  - *UA reflex to Microscopic and Culture ([place] [facility] (except [place] and [place]); Future    Do you want her to come in now for a repeat UA?     Fifi Garcia RN   [facility] -- Triage Nurse

## 2020-08-21 ENCOUNTER — HOSPITAL ENCOUNTER (OUTPATIENT)
Dept: CT IMAGING | Facility: CLINIC | Age: 40
Discharge: HOME | End: 2020-08-21
Attending: INTERNAL MEDICINE | Admitting: INTERNAL MEDICINE
Payer: COMMERCIAL

## 2020-08-21 DIAGNOSIS — R10.2 PELVIC PAIN IN FEMALE: ICD-10-CM

## 2020-08-21 DIAGNOSIS — N92.0 SPOTTING BETWEEN MENSES: ICD-10-CM

## 2020-08-21 PROCEDURE — 25000125 ZZHC RX 250: Performed by: INTERNAL MEDICINE

## 2020-08-21 PROCEDURE — 25000128 H RX IP 250 OP 636: Performed by: INTERNAL MEDICINE

## 2020-08-21 PROCEDURE — 74177 CT ABD & PELVIS W/CONTRAST: CPT

## 2020-08-21 RX ORDER — IOPAMIDOL 755 MG/ML
500 INJECTION, SOLUTION INTRAVASCULAR ONCE
Status: COMPLETED | OUTPATIENT
Start: 2020-08-21 | End: 2020-08-21

## 2020-08-21 RX ADMIN — SODIUM CHLORIDE 49 ML: 9 INJECTION, SOLUTION INTRAVENOUS at 08:12

## 2020-08-21 RX ADMIN — IOPAMIDOL 76 ML: 755 INJECTION, SOLUTION INTRAVENOUS at 08:12

## 2020-08-28 DIAGNOSIS — N30.91 HEMORRHAGIC CYSTITIS: ICD-10-CM

## 2020-08-28 LAB
ALBUMIN UR-MCNC: NEGATIVE MG/DL
APPEARANCE UR: CLEAR
BILIRUB UR QL STRIP: NEGATIVE
COLOR UR AUTO: YELLOW
GLUCOSE UR STRIP-MCNC: NEGATIVE MG/DL
HGB UR QL STRIP: NEGATIVE
KETONES UR STRIP-MCNC: NEGATIVE MG/DL
LEUKOCYTE ESTERASE UR QL STRIP: ABNORMAL
NITRATE UR QL: NEGATIVE
PH UR STRIP: 6 PH (ref 5–7)
RBC #/AREA URNS AUTO: NORMAL /HPF
SOURCE: ABNORMAL
SP GR UR STRIP: 1.01 (ref 1–1.03)
UROBILINOGEN UR STRIP-ACNC: 0.2 EU/DL (ref 0.2–1)
WBC #/AREA URNS AUTO: NORMAL /HPF

## 2020-08-28 PROCEDURE — 81001 URINALYSIS AUTO W/SCOPE: CPT | Performed by: INTERNAL MEDICINE

## 2020-08-28 NOTE — TELEPHONE ENCOUNTER
CHG call to patient to assist with scheduling lab only appointment for UA. Appointment scheduled for today (08/28/2020) at 1345 at [facility].    Closing encounter.    Jose Guadalupe Irizarry at 10:18 AM on 8/28/2020  [facility]  Phone [phone]

## 2020-09-04 ENCOUNTER — MYC MEDICAL ADVICE (OUTPATIENT)
Dept: PEDIATRICS | Facility: CLINIC | Age: 40
End: 2020-09-04

## 2020-09-04 ENCOUNTER — OFFICE VISIT (OUTPATIENT)
Dept: OBGYN | Facility: CLINIC | Age: 40
End: 2020-09-04
Payer: COMMERCIAL

## 2020-09-04 VITALS — WEIGHT: 156.9 LBS | BODY MASS INDEX: 23.68 KG/M2 | SYSTOLIC BLOOD PRESSURE: 88 MMHG | DIASTOLIC BLOOD PRESSURE: 58 MMHG

## 2020-09-04 DIAGNOSIS — R10.2 PELVIC PAIN IN FEMALE: Primary | ICD-10-CM

## 2020-09-04 PROCEDURE — 99213 OFFICE O/P EST LOW 20 MIN: CPT | Performed by: OBSTETRICS & GYNECOLOGY

## 2020-12-26 ENCOUNTER — MYC MEDICAL ADVICE (OUTPATIENT)
Dept: PEDIATRICS | Facility: CLINIC | Age: 40
End: 2020-12-26

## 2020-12-26 DIAGNOSIS — Z01.84 IMMUNITY STATUS TESTING: Primary | ICD-10-CM

## 2020-12-28 NOTE — TELEPHONE ENCOUNTER
Scheduled lab for titers and nurse only for Tdap.  Talked with Janae, needs Hepatitis B titer, MMR titer and varicella titer.  Sorry could not find Hep B titer and measles titer.    Kenyetta Beauchamp LPN

## 2021-01-06 DIAGNOSIS — Z01.84 IMMUNITY STATUS TESTING: ICD-10-CM

## 2021-01-06 DIAGNOSIS — E06.3 HYPOTHYROIDISM DUE TO HASHIMOTO'S THYROIDITIS: ICD-10-CM

## 2021-01-06 LAB — HBV SURFACE AB SERPL IA-ACNC: 114.66 M[IU]/ML

## 2021-01-06 PROCEDURE — 86765 RUBEOLA ANTIBODY: CPT | Performed by: INTERNAL MEDICINE

## 2021-01-06 PROCEDURE — 86706 HEP B SURFACE ANTIBODY: CPT | Performed by: INTERNAL MEDICINE

## 2021-01-06 PROCEDURE — 84443 ASSAY THYROID STIM HORMONE: CPT | Performed by: INTERNAL MEDICINE

## 2021-01-06 PROCEDURE — 99000 SPECIMEN HANDLING OFFICE-LAB: CPT | Performed by: INTERNAL MEDICINE

## 2021-01-06 PROCEDURE — 36415 COLL VENOUS BLD VENIPUNCTURE: CPT | Performed by: INTERNAL MEDICINE

## 2021-01-06 PROCEDURE — 86787 VARICELLA-ZOSTER ANTIBODY: CPT | Performed by: INTERNAL MEDICINE

## 2021-01-06 PROCEDURE — 86762 RUBELLA ANTIBODY: CPT | Performed by: INTERNAL MEDICINE

## 2021-01-06 PROCEDURE — 86735 MUMPS ANTIBODY: CPT | Mod: 90 | Performed by: INTERNAL MEDICINE

## 2021-01-07 LAB
MEV IGG SER QL IA: 2.9 AI (ref 0–0.8)
MUV IGG SER QL IA: 2 AI (ref 0–0.8)
RUBV IGM SER QL: <0.2 AI (ref 0–0.8)
TSH SERPL DL<=0.005 MIU/L-ACNC: 1.7 MU/L (ref 0.4–4)
VZV IGG SER QL IA: 2 AI (ref 0–0.8)

## 2021-01-15 ENCOUNTER — HEALTH MAINTENANCE LETTER (OUTPATIENT)
Age: 41
End: 2021-01-15

## 2021-03-20 ENCOUNTER — HEALTH MAINTENANCE LETTER (OUTPATIENT)
Age: 41
End: 2021-03-20

## 2021-03-22 ENCOUNTER — MYC MEDICAL ADVICE (OUTPATIENT)
Dept: PEDIATRICS | Facility: CLINIC | Age: 41
End: 2021-03-22

## 2021-03-22 DIAGNOSIS — N63.0 LUMP OR MASS IN BREAST: Primary | ICD-10-CM

## 2021-03-29 ENCOUNTER — HOSPITAL ENCOUNTER (OUTPATIENT)
Dept: ULTRASOUND IMAGING | Facility: CLINIC | Age: 41
End: 2021-03-29
Attending: INTERNAL MEDICINE
Payer: COMMERCIAL

## 2021-03-29 ENCOUNTER — HOSPITAL ENCOUNTER (OUTPATIENT)
Dept: MAMMOGRAPHY | Facility: CLINIC | Age: 41
End: 2021-03-29
Attending: INTERNAL MEDICINE
Payer: COMMERCIAL

## 2021-03-29 DIAGNOSIS — N63.0 LUMP OR MASS IN BREAST: ICD-10-CM

## 2021-03-29 PROCEDURE — 76642 ULTRASOUND BREAST LIMITED: CPT | Mod: RT

## 2021-03-29 PROCEDURE — G0279 TOMOSYNTHESIS, MAMMO: HCPCS

## 2021-10-05 DIAGNOSIS — E06.3 HYPOTHYROIDISM DUE TO HASHIMOTO'S THYROIDITIS: ICD-10-CM

## 2021-10-08 NOTE — TELEPHONE ENCOUNTER
Failed protocol.  please route to  team if patient needs an appointment     Kenya MITCHELLRN BSN  [facility]  [phone]

## 2021-10-11 RX ORDER — LEVOTHYROXINE SODIUM 125 UG/1
TABLET ORAL
Qty: 102 TABLET | Refills: 0 | Status: SHIPPED | OUTPATIENT
Start: 2021-10-11 | End: 2022-06-14 | Stop reason: DRUGHIGH

## 2021-10-12 ENCOUNTER — MYC MEDICAL ADVICE (OUTPATIENT)
Dept: PEDIATRICS | Facility: CLINIC | Age: 41
End: 2021-10-12

## 2021-10-24 ENCOUNTER — HEALTH MAINTENANCE LETTER (OUTPATIENT)
Age: 41
End: 2021-10-24

## 2022-02-02 ENCOUNTER — LAB (OUTPATIENT)
Dept: LAB | Facility: CLINIC | Age: 42
End: 2022-02-02
Attending: INTERNAL MEDICINE
Payer: COMMERCIAL

## 2022-02-02 DIAGNOSIS — E06.3 HYPOTHYROIDISM DUE TO HASHIMOTO'S THYROIDITIS: ICD-10-CM

## 2022-02-02 PROCEDURE — 84443 ASSAY THYROID STIM HORMONE: CPT

## 2022-02-02 PROCEDURE — 84439 ASSAY OF FREE THYROXINE: CPT

## 2022-02-02 PROCEDURE — 36415 COLL VENOUS BLD VENIPUNCTURE: CPT

## 2022-02-03 LAB
T4 FREE SERPL-MCNC: 1.05 NG/DL (ref 0.76–1.46)
TSH SERPL DL<=0.005 MIU/L-ACNC: 4.32 MU/L (ref 0.4–4)

## 2022-02-21 DIAGNOSIS — E06.3 HYPOTHYROIDISM DUE TO HASHIMOTO'S THYROIDITIS: ICD-10-CM

## 2022-02-21 RX ORDER — LEVOTHYROXINE SODIUM 150 UG/1
150 TABLET ORAL DAILY
Qty: 90 TABLET | Refills: 0 | Status: SHIPPED | OUTPATIENT
Start: 2022-02-21 | End: 2022-06-14

## 2022-02-21 RX ORDER — LEVOTHYROXINE SODIUM 125 UG/1
TABLET ORAL
Qty: 102 TABLET | Refills: 0 | OUTPATIENT
Start: 2022-02-21

## 2022-02-21 NOTE — TELEPHONE ENCOUNTER
Routing refill request to provider for review/approval because:  Labs out of range:  TSH    TSH   Date Value Ref Range Status   02/02/2022 4.32 (H) 0.40 - 4.00 mU/L Final   01/06/2021 1.70 0.40 - 4.00 mU/L Final     Janae Yao, RN on 2/21/2022 at 11:09 AM

## 2022-02-21 NOTE — TELEPHONE ENCOUNTER
Patient is out of her medication. Patient has appt 3/21/22 with Dr. Barron. She needs enough medication to last until then.

## 2022-03-28 ENCOUNTER — HOSPITAL ENCOUNTER (OUTPATIENT)
Dept: MAMMOGRAPHY | Facility: CLINIC | Age: 42
Discharge: HOME | End: 2022-03-28
Attending: INTERNAL MEDICINE | Admitting: INTERNAL MEDICINE
Payer: COMMERCIAL

## 2022-03-28 DIAGNOSIS — Z12.31 VISIT FOR SCREENING MAMMOGRAM: ICD-10-CM

## 2022-03-28 PROCEDURE — 77067 SCR MAMMO BI INCL CAD: CPT

## 2022-04-10 ENCOUNTER — HEALTH MAINTENANCE LETTER (OUTPATIENT)
Age: 42
End: 2022-04-10

## 2022-06-13 ENCOUNTER — LAB (OUTPATIENT)
Dept: LAB | Facility: CLINIC | Age: 42
End: 2022-06-13
Payer: COMMERCIAL

## 2022-06-13 DIAGNOSIS — E06.3 HYPOTHYROIDISM DUE TO HASHIMOTO'S THYROIDITIS: ICD-10-CM

## 2022-06-13 LAB — TSH SERPL DL<=0.005 MIU/L-ACNC: 0.53 MU/L (ref 0.4–4)

## 2022-06-13 PROCEDURE — 84443 ASSAY THYROID STIM HORMONE: CPT

## 2022-06-13 PROCEDURE — 36415 COLL VENOUS BLD VENIPUNCTURE: CPT

## 2022-06-14 DIAGNOSIS — E06.3 HYPOTHYROIDISM DUE TO HASHIMOTO'S THYROIDITIS: ICD-10-CM

## 2022-06-14 RX ORDER — LEVOTHYROXINE SODIUM 150 UG/1
150 TABLET ORAL DAILY
Qty: 90 TABLET | Refills: 3 | Status: SHIPPED | OUTPATIENT
Start: 2022-06-14 | End: 2022-06-29

## 2022-06-29 ENCOUNTER — OFFICE VISIT (OUTPATIENT)
Dept: PEDIATRICS | Facility: CLINIC | Age: 42
End: 2022-06-29
Payer: COMMERCIAL

## 2022-06-29 VITALS
RESPIRATION RATE: 16 BRPM | HEIGHT: 68 IN | OXYGEN SATURATION: 100 % | HEART RATE: 66 BPM | DIASTOLIC BLOOD PRESSURE: 64 MMHG | TEMPERATURE: 97.5 F | BODY MASS INDEX: 23.39 KG/M2 | WEIGHT: 154.3 LBS | SYSTOLIC BLOOD PRESSURE: 110 MMHG

## 2022-06-29 DIAGNOSIS — Z00.00 ENCOUNTER FOR ROUTINE ADULT HEALTH EXAMINATION WITHOUT ABNORMAL FINDINGS: Primary | ICD-10-CM

## 2022-06-29 DIAGNOSIS — Z11.59 NEED FOR HEPATITIS C SCREENING TEST: ICD-10-CM

## 2022-06-29 DIAGNOSIS — Z23 NEED FOR DIPHTHERIA-TETANUS-PERTUSSIS (TDAP) VACCINE: ICD-10-CM

## 2022-06-29 DIAGNOSIS — Z13.220 LIPID SCREENING: ICD-10-CM

## 2022-06-29 DIAGNOSIS — E06.3 HYPOTHYROIDISM DUE TO HASHIMOTO'S THYROIDITIS: ICD-10-CM

## 2022-06-29 DIAGNOSIS — Z23 NEED FOR COVID-19 VACCINE: ICD-10-CM

## 2022-06-29 PROCEDURE — 91305 COVID-19,PF,PFIZER (12+ YRS): CPT | Performed by: INTERNAL MEDICINE

## 2022-06-29 PROCEDURE — 90715 TDAP VACCINE 7 YRS/> IM: CPT | Performed by: INTERNAL MEDICINE

## 2022-06-29 PROCEDURE — 90471 IMMUNIZATION ADMIN: CPT | Performed by: INTERNAL MEDICINE

## 2022-06-29 PROCEDURE — 0054A COVID-19,PF,PFIZER (12+ YRS): CPT | Performed by: INTERNAL MEDICINE

## 2022-06-29 PROCEDURE — 99396 PREV VISIT EST AGE 40-64: CPT | Mod: 25 | Performed by: INTERNAL MEDICINE

## 2022-06-29 RX ORDER — LEVOTHYROXINE SODIUM 150 UG/1
150 TABLET ORAL DAILY
Qty: 90 TABLET | Refills: 4 | Status: SHIPPED | OUTPATIENT
Start: 2022-06-29 | End: 2023-08-11

## 2022-06-29 SDOH — ECONOMIC STABILITY: INCOME INSECURITY: HOW HARD IS IT FOR YOU TO PAY FOR THE VERY BASICS LIKE FOOD, HOUSING, MEDICAL CARE, AND HEATING?: NOT HARD AT ALL

## 2022-06-29 SDOH — ECONOMIC STABILITY: TRANSPORTATION INSECURITY
IN THE PAST 12 MONTHS, HAS THE LACK OF TRANSPORTATION KEPT YOU FROM MEDICAL APPOINTMENTS OR FROM GETTING MEDICATIONS?: NO

## 2022-06-29 SDOH — ECONOMIC STABILITY: FOOD INSECURITY: WITHIN THE PAST 12 MONTHS, YOU WORRIED THAT YOUR FOOD WOULD RUN OUT BEFORE YOU GOT MONEY TO BUY MORE.: NEVER TRUE

## 2022-06-29 SDOH — HEALTH STABILITY: PHYSICAL HEALTH: ON AVERAGE, HOW MANY DAYS PER WEEK DO YOU ENGAGE IN MODERATE TO STRENUOUS EXERCISE (LIKE A BRISK WALK)?: 1 DAY

## 2022-06-29 SDOH — ECONOMIC STABILITY: TRANSPORTATION INSECURITY
IN THE PAST 12 MONTHS, HAS LACK OF TRANSPORTATION KEPT YOU FROM MEETINGS, WORK, OR FROM GETTING THINGS NEEDED FOR DAILY LIVING?: NO

## 2022-06-29 SDOH — HEALTH STABILITY: PHYSICAL HEALTH: ON AVERAGE, HOW MANY MINUTES DO YOU ENGAGE IN EXERCISE AT THIS LEVEL?: 60 MIN

## 2022-06-29 SDOH — ECONOMIC STABILITY: INCOME INSECURITY: IN THE LAST 12 MONTHS, WAS THERE A TIME WHEN YOU WERE NOT ABLE TO PAY THE MORTGAGE OR RENT ON TIME?: NO

## 2022-06-29 SDOH — ECONOMIC STABILITY: FOOD INSECURITY: WITHIN THE PAST 12 MONTHS, THE FOOD YOU BOUGHT JUST DIDN'T LAST AND YOU DIDN'T HAVE MONEY TO GET MORE.: NEVER TRUE

## 2022-06-29 ASSESSMENT — PAIN SCALES - GENERAL: PAINLEVEL: NO PAIN (0)

## 2022-06-29 ASSESSMENT — ENCOUNTER SYMPTOMS
HEMATOCHEZIA: 0
DIARRHEA: 0
HEARTBURN: 0
CONSTIPATION: 0
PALPITATIONS: 0
SHORTNESS OF BREATH: 0
ARTHRALGIAS: 0
PARESTHESIAS: 0
WEAKNESS: 0
FREQUENCY: 0
BREAST MASS: 0
SORE THROAT: 0
EYE PAIN: 0
NERVOUS/ANXIOUS: 0
HEADACHES: 0
NAUSEA: 0
MYALGIAS: 0
FEVER: 0
CHILLS: 0
HEMATURIA: 0
DYSURIA: 0
JOINT SWELLING: 0
ABDOMINAL PAIN: 0
COUGH: 0
DIZZINESS: 0

## 2022-06-29 ASSESSMENT — SOCIAL DETERMINANTS OF HEALTH (SDOH)
HOW OFTEN DO YOU GET TOGETHER WITH FRIENDS OR RELATIVES?: MORE THAN THREE TIMES A WEEK
HOW OFTEN DO YOU ATTEND CHURCH OR RELIGIOUS SERVICES?: 1 TO 4 TIMES PER YEAR
IN A TYPICAL WEEK, HOW MANY TIMES DO YOU TALK ON THE PHONE WITH FAMILY, FRIENDS, OR NEIGHBORS?: MORE THAN THREE TIMES A WEEK
DO YOU BELONG TO ANY CLUBS OR ORGANIZATIONS SUCH AS CHURCH GROUPS UNIONS, FRATERNAL OR ATHLETIC GROUPS, OR SCHOOL GROUPS?: NO

## 2022-06-29 ASSESSMENT — LIFESTYLE VARIABLES
HOW MANY STANDARD DRINKS CONTAINING ALCOHOL DO YOU HAVE ON A TYPICAL DAY: 1 OR 2
HOW OFTEN DO YOU HAVE SIX OR MORE DRINKS ON ONE OCCASION: NEVER
AUDIT-C TOTAL SCORE: 2
SKIP TO QUESTIONS 9-10: 1
HOW OFTEN DO YOU HAVE A DRINK CONTAINING ALCOHOL: 2-4 TIMES A MONTH

## 2022-06-29 NOTE — PATIENT INSTRUCTIONS
7/5/2022 10:00 AM Romi Rose [facility]     For your mom, your triage RN, Elena, can be reached at [phone]. You can leave a message and she will get back to you.

## 2022-06-29 NOTE — PROGRESS NOTES
SUBJECTIVE:   CC: Janae Stanton is an 42 year old woman who presents for preventive health visit.     Healthy Habits:     Getting at least 3 servings of Calcium per day:  NO    Bi-annual eye exam:  Yes    Dental care twice a year:  Yes    Sleep apnea or symptoms of sleep apnea:  None    Diet:  Other    Frequency of exercise:  2-3 days/week    Duration of exercise:  15-30 minutes    Taking medications regularly:  Yes    Medication side effects:  None    PHQ-2 Total Score: 0    Additional concerns today:  No    Today's PHQ-2 Score:   PHQ-2 ( 1999 Pfizer) 6/29/2022   Q1: Little interest or pleasure in doing things 0   Q2: Feeling down, depressed or hopeless 0   PHQ-2 Score 0   PHQ-2 Total Score (12-17 Years)- Positive if 3 or more points; Administer PHQ-A if positive -   Q1: Little interest or pleasure in doing things Not at all   Q2: Feeling down, depressed or hopeless Not at all   PHQ-2 Score 0     Abuse: Current or Past (Physical, Sexual or Emotional) - No  Do you feel safe in your environment? Yes    Have you ever done Advance Care Planning? (For example, a Health Directive, POLST, or a discussion with a medical provider or your loved ones about your wishes): No, advance care planning information given to patient to review.  Patient declined advance care planning discussion at this time.    Social History     Tobacco Use     Smoking status: Never Smoker     Smokeless tobacco: Never Used   Substance Use Topics     Alcohol use: Yes     Alcohol/week: 0.0 standard drinks     Comment: Rare     Alcohol Use 6/29/2022   Prescreen: >3 drinks/day or >7 drinks/week? No   Prescreen: >3 drinks/day or >7 drinks/week? -     Reviewed orders with patient.  Reviewed health maintenance and updated orders accordingly - Yes  Labs reviewed in EPIC    Breast Cancer Screening:    Breast CA Risk Assessment (FHS-7) 6/29/2022   Do you have a family history of breast, colon, or ovarian cancer? No / Unknown     Mammogram Screening -  "Offered annual screening and updated Health Maintenance for mutual plan based on risk factor consideration    Pertinent mammograms are reviewed under the imaging tab.    History of abnormal Pap smear: NO - age 30-65 PAP every 5 years with negative HPV co-testing recommended  PAP / HPV Latest Ref Rng & Units 3/1/2019 3/23/2016 8/3/2006   PAP (Historical) - NIL NIL NIL   HPV16 NEG:Negative Negative - -   HPV18 NEG:Negative Negative - -   HRHPV NEG:Negative Negative - -     Reviewed and updated as needed this visit by clinical staff   Tobacco  Allergies  Meds   Med Hx  Surg Hx  Fam Hx  Soc Hx          Reviewed and updated as needed this visit by Provider                       Review of Systems   Constitutional: Negative for chills and fever.   HENT: Negative for congestion, ear pain, hearing loss and sore throat.    Eyes: Negative for pain and visual disturbance.   Respiratory: Negative for cough and shortness of breath.    Cardiovascular: Negative for chest pain, palpitations and peripheral edema.   Gastrointestinal: Negative for abdominal pain, constipation, diarrhea, heartburn, hematochezia and nausea.   Breasts:  Negative for tenderness, breast mass and discharge.   Genitourinary: Negative for dysuria, frequency, genital sores, hematuria, pelvic pain, urgency, vaginal bleeding and vaginal discharge.   Musculoskeletal: Negative for arthralgias, joint swelling and myalgias.   Skin: Negative for rash.   Neurological: Negative for dizziness, weakness, headaches and paresthesias.   Psychiatric/Behavioral: Negative for mood changes. The patient is not nervous/anxious.         OBJECTIVE:   /64 (BP Location: Right arm, Patient Position: Sitting, Cuff Size: Adult Regular)   Pulse 66   Temp 97.5  F (36.4  C) (Tympanic)   Resp 16   Ht 1.727 m (5' 8\")   Wt 70 kg (154 lb 4.8 oz)   LMP  (LMP Unknown)   SpO2 100%   BMI 23.46 kg/m    Physical Exam  GENERAL: healthy, alert and no distress  EYES: Eyes grossly " "normal to inspection, PERRL and conjunctivae and sclerae normal  NECK: no adenopathy, no asymmetry, masses, or scars and thyroid normal to palpation  RESP: lungs clear to auscultation - no rales, rhonchi or wheezes  CV: regular rate and rhythm, normal S1 S2, no S3 or S4, no murmur, click or rub, no peripheral edema  ABDOMEN: soft, nontender, no hepatosplenomegaly, no masses and bowel sounds normal  MS: no gross musculoskeletal defects noted, no edema  SKIN: no suspicious lesions or rashes  NEURO: Normal strength and tone, mentation intact and speech normal  PSYCH: mentation appears normal, affect normal/bright    Diagnostic Test Results:  Labs reviewed in Epic    ASSESSMENT/PLAN:     1. Encounter for routine adult health examination without abnormal findings      2. Hypothyroidism due to Hashimoto's thyroiditis  Stable.  - levothyroxine (SYNTHROID/LEVOTHROID) 150 MCG tablet; Take 1 tablet (150 mcg) by mouth daily New dose.  Dispense: 90 tablet; Refill: 4    3. Need for hepatitis C screening test      4. Lipid screening    - Lipid panel reflex to direct LDL Fasting; Future    5. Need for COVID-19 vaccine    - COVID-19,PF,PFIZER (12+ Yrs GRAY LABEL)    6. Need for diphtheria-tetanus-pertussis (Tdap) vaccine    - TDAP VACCINE (Adacel, Boostrix)  [[id number]]      COUNSELING:  Reviewed preventive health counseling, as reflected in patient instructions    Estimated body mass index is 23.46 kg/m  as calculated from the following:    Height as of this encounter: 1.727 m (5' 8\").    Weight as of this encounter: 70 kg (154 lb 4.8 oz).        She reports that she has never smoked. She has never used smokeless tobacco.    Counseling Resources:  ATP IV Guidelines  Pooled Cohorts Equation Calculator  Breast Cancer Risk Calculator  BRCA-Related Cancer Risk Assessment: FHS-7 Tool  FRAX Risk Assessment  ICSI Preventive Guidelines  Dietary Guidelines for Americans, 2010  USDA's MyPlate  ASA Prophylaxis  Lung CA Screening    Jossy CEBALLOS " MD [place]  [facility]

## 2022-10-16 ENCOUNTER — HEALTH MAINTENANCE LETTER (OUTPATIENT)
Age: 42
End: 2022-10-16

## 2022-10-24 ENCOUNTER — MYC MEDICAL ADVICE (OUTPATIENT)
Dept: PEDIATRICS | Facility: CLINIC | Age: 42
End: 2022-10-24

## 2023-04-21 ENCOUNTER — TRANSFERRED RECORDS (OUTPATIENT)
Dept: MULTI SPECIALTY CLINIC | Facility: CLINIC | Age: 43
End: 2023-04-21

## 2023-04-28 ENCOUNTER — HOSPITAL ENCOUNTER (OUTPATIENT)
Dept: MAMMOGRAPHY | Facility: CLINIC | Age: 43
Discharge: HOME | End: 2023-04-28
Attending: INTERNAL MEDICINE | Admitting: INTERNAL MEDICINE
Payer: COMMERCIAL

## 2023-04-28 DIAGNOSIS — Z12.31 ENCOUNTER FOR SCREENING MAMMOGRAM FOR MALIGNANT NEOPLASM OF BREAST: ICD-10-CM

## 2023-04-28 PROCEDURE — 77067 SCR MAMMO BI INCL CAD: CPT

## 2023-05-30 ENCOUNTER — LAB (OUTPATIENT)
Dept: LAB | Facility: CLINIC | Age: 43
End: 2023-05-30
Payer: COMMERCIAL

## 2023-05-30 DIAGNOSIS — E06.3 HASHIMOTO'S THYROIDITIS: ICD-10-CM

## 2023-05-30 DIAGNOSIS — Z13.220 LIPID SCREENING: ICD-10-CM

## 2023-05-30 DIAGNOSIS — Z11.59 NEED FOR HEPATITIS C SCREENING TEST: ICD-10-CM

## 2023-05-30 LAB
CHOLEST SERPL-MCNC: 217 MG/DL
HDLC SERPL-MCNC: 84 MG/DL
LDLC SERPL CALC-MCNC: 118 MG/DL
NONHDLC SERPL-MCNC: 133 MG/DL
TRIGL SERPL-MCNC: 77 MG/DL
TSH SERPL DL<=0.005 MIU/L-ACNC: 3.64 UIU/ML (ref 0.3–4.2)

## 2023-05-30 PROCEDURE — 86803 HEPATITIS C AB TEST: CPT

## 2023-05-30 PROCEDURE — 36415 COLL VENOUS BLD VENIPUNCTURE: CPT

## 2023-05-30 PROCEDURE — 80061 LIPID PANEL: CPT

## 2023-05-30 PROCEDURE — 84443 ASSAY THYROID STIM HORMONE: CPT

## 2023-05-31 LAB — HCV AB SERPL QL IA: NONREACTIVE

## 2023-06-12 NOTE — PROGRESS NOTES
"  SUBJECTIVE:                                                   Janae Stanton is a 40 year old female, P3, who presents to clinic today for the following health issue(s):  CT review and follow up.  Recent episode of hemorrhagic cystitis as well as pelvic pain, irregular bleeding and vaginal discharge    Patient discovered and removed a \"lost\" tampon from the posterior vagina.  Her symptoms have now largely resolved    HPI:  CT report from :    FINDINGS:   LOWER CHEST: Normal.     HEPATOBILIARY: Normal.     PANCREAS: Normal.     SPLEEN: Normal.     ADRENAL GLANDS: Normal.     KIDNEYS/BLADDER: Normal.     BOWEL: Stomach and small bowel unremarkable. No obstruction. The  appendix is visualized, and within normal limits. Mild stool burden in  the colon.     LYMPH NODES: No abdominopelvic lymphadenopathy. No ascites. No free  air.     VASCULATURE: No abdominal aortic aneurysm.     PELVIC ORGANS: IUD in the uterus. Prominent left periuterine and  draining gonadal veins. There is a 1.2 cm probable right corpus luteal  cyst.     MUSCULOSKELETAL: Bilateral L5 pars defects with mild anterolisthesis  of L5 on S1.                                                                      IMPRESSION:   1.  IUD appears in good position by CT.     2.  By CT, no acute findings. If the clinical concern is pelvic mass  or pain, then pelvic ultrasound or MR may provide additional  information.    No LMP recorded. (Menstrual status: IUD)..   Patient is sexually active, .  Using IUD for contraception.     Problem list and histories reviewed & adjusted, as indicated.  Additional history: as documented.    Patient Active Problem List   Diagnosis     H/O vitamin D deficiency     CARDIOVASCULAR SCREENING; LDL GOAL LESS THAN 160     Hypothyroidism     Hashimoto's thyroiditis     Encounter for removal and reinsertion of Mireana IUD: Due for removal 2024     Past Surgical History:   Procedure Laterality Date     NO HISTORY OF SURGERY  " 2019 bi-V AICD implant  Improved function since implant.   Following with EP and remote device checks         Social History     Tobacco Use     Smoking status: Never Smoker     Smokeless tobacco: Never Used   Substance Use Topics     Alcohol use: Yes     Alcohol/week: 0.0 standard drinks     Comment: Rare      Problem (# of Occurrences) Relation (Name,Age of Onset)    Depression (2) Father: alive age 61 PTST, Sister: alive age  27    Family History Negative (1) Sister: alive age  30 sinus problems    Heart Disease (2) Paternal Grandmother: , Maternal Grandmother:     Hypertension (1) Mother: alive age 59  DM, lipids    Thyroid Disease (2) Sister: alive age 32, Maternal Grandfather            cholecalciferol 25 MCG (1000 UT) TABS, Take 1 tablet by mouth daily  levonorgestrel (MIRENA) 20 MCG/24HR IUD, 1 each (20 mcg) by Intrauterine route once for 1 dose Started 2013  levothyroxine (SYNTHROID/LEVOTHROID) 125 MCG tablet, Take 1 tablet daily 6 days a week, and 2 tablets every 7th day for total of 8 tabs per week    No current facility-administered medications on file prior to visit.     Allergies   Allergen Reactions     Sulfa Drugs      GI issues       ROS:  5 point ROS negative except as noted above in HPI, including Gen., Resp., CV, GI &  system review.    OBJECTIVE:     There were no vitals taken for this visit.   BMI: There is no height or weight on file to calculate BMI.  General: Alert and oriented, no distress.  Psychiatric: Mood and affect within normal limits.  Skin: Warm and dry, no lesions, rashes or discolorations.     Abdomen: Soft, nontender, no hepatosplenomegaly, no rebound or guarding, no masses, no hernias.   Vulva:  No external lesions, normal female hair distribution, no inguinal adenopathy.    Urethra:  Midline, non-tender, well supported, no discharge  Vagina:  Well-estrogenized, no abnormal discharge, no lesions  Cervix: no lesions, no discharge, multiparous and IUD strings visible at Os  Uterus:  anteverted, smooth contour, without enlargement, mobile, and without  tenderness  Ovaries:  No masses appreciated, non-tender, mobile  Rectal Exam: deferred  Musculoskeletal: extremities normal        ASSESSMENT/PLAN:                                                      Pelvic pain and vaginal discharge    Recent CT scan normal and symptoms have resolved after removal of a lost tampon.    RTC prn    Richie Allen MD  [facility]

## 2023-08-11 DIAGNOSIS — E06.3 HYPOTHYROIDISM DUE TO HASHIMOTO'S THYROIDITIS: ICD-10-CM

## 2023-08-14 RX ORDER — LEVOTHYROXINE SODIUM 150 UG/1
150 TABLET ORAL DAILY
Qty: 90 TABLET | Refills: 0 | Status: SHIPPED | OUTPATIENT
Start: 2023-08-14 | End: 2023-09-27

## 2023-08-14 NOTE — TELEPHONE ENCOUNTER
Routing refill request to provider for review/approval because:  Patient needs to be seen because it has been more than 1 year since last office visit.    Appointments in Next Year      Sep 27, 2023  2:20 PM  (Arrive by 2:00 PM)  Adult Preventative Visit with Jossy Barron MD  [facility] ([facility] ) [phone]          Bev Butt RN on 8/14/2023 at 3:42 PM

## 2023-08-26 ENCOUNTER — HEALTH MAINTENANCE LETTER (OUTPATIENT)
Age: 43
End: 2023-08-26

## 2023-09-25 SDOH — HEALTH STABILITY: PHYSICAL HEALTH: ON AVERAGE, HOW MANY DAYS PER WEEK DO YOU ENGAGE IN MODERATE TO STRENUOUS EXERCISE (LIKE A BRISK WALK)?: 1 DAY

## 2023-09-25 SDOH — HEALTH STABILITY: PHYSICAL HEALTH: ON AVERAGE, HOW MANY MINUTES DO YOU ENGAGE IN EXERCISE AT THIS LEVEL?: 30 MIN

## 2023-09-25 ASSESSMENT — LIFESTYLE VARIABLES
AUDIT-C TOTAL SCORE: 2
HOW OFTEN DO YOU HAVE A DRINK CONTAINING ALCOHOL: 2-4 TIMES A MONTH
SKIP TO QUESTIONS 9-10: 1
HOW MANY STANDARD DRINKS CONTAINING ALCOHOL DO YOU HAVE ON A TYPICAL DAY: 1 OR 2
HOW OFTEN DO YOU HAVE SIX OR MORE DRINKS ON ONE OCCASION: NEVER

## 2023-09-25 ASSESSMENT — ENCOUNTER SYMPTOMS
CHILLS: 0
FEVER: 0
PARESTHESIAS: 0
JOINT SWELLING: 0
SORE THROAT: 0
HEADACHES: 0
NERVOUS/ANXIOUS: 0
DIARRHEA: 0
SHORTNESS OF BREATH: 0
BREAST MASS: 0
ARTHRALGIAS: 0
NAUSEA: 0
EYE PAIN: 0
DYSURIA: 0
ABDOMINAL PAIN: 0
COUGH: 0
MYALGIAS: 0
HEARTBURN: 0
CONSTIPATION: 0
HEMATURIA: 0
DIZZINESS: 0
PALPITATIONS: 0
WEAKNESS: 0
FREQUENCY: 0
HEMATOCHEZIA: 0

## 2023-09-25 ASSESSMENT — SOCIAL DETERMINANTS OF HEALTH (SDOH)
HOW OFTEN DO YOU ATTENT MEETINGS OF THE CLUB OR ORGANIZATION YOU BELONG TO?: MORE THAN 4 TIMES PER YEAR
HOW OFTEN DO YOU GET TOGETHER WITH FRIENDS OR RELATIVES?: TWICE A WEEK
HOW OFTEN DO YOU ATTEND CHURCH OR RELIGIOUS SERVICES?: 1 TO 4 TIMES PER YEAR
IN A TYPICAL WEEK, HOW MANY TIMES DO YOU TALK ON THE PHONE WITH FAMILY, FRIENDS, OR NEIGHBORS?: MORE THAN THREE TIMES A WEEK
DO YOU BELONG TO ANY CLUBS OR ORGANIZATIONS SUCH AS CHURCH GROUPS UNIONS, FRATERNAL OR ATHLETIC GROUPS, OR SCHOOL GROUPS?: YES

## 2023-09-27 ENCOUNTER — OFFICE VISIT (OUTPATIENT)
Dept: PEDIATRICS | Facility: CLINIC | Age: 43
End: 2023-09-27
Payer: COMMERCIAL

## 2023-09-27 VITALS
HEART RATE: 62 BPM | HEIGHT: 68 IN | SYSTOLIC BLOOD PRESSURE: 100 MMHG | RESPIRATION RATE: 16 BRPM | TEMPERATURE: 98 F | DIASTOLIC BLOOD PRESSURE: 62 MMHG | WEIGHT: 162.1 LBS | BODY MASS INDEX: 24.57 KG/M2 | OXYGEN SATURATION: 100 %

## 2023-09-27 DIAGNOSIS — Z00.00 ENCOUNTER FOR ROUTINE ADULT HEALTH EXAMINATION WITHOUT ABNORMAL FINDINGS: Primary | ICD-10-CM

## 2023-09-27 DIAGNOSIS — E06.3 HYPOTHYROIDISM DUE TO HASHIMOTO'S THYROIDITIS: ICD-10-CM

## 2023-09-27 DIAGNOSIS — Z12.11 SCREEN FOR COLON CANCER: ICD-10-CM

## 2023-09-27 PROCEDURE — 90686 IIV4 VACC NO PRSV 0.5 ML IM: CPT | Performed by: INTERNAL MEDICINE

## 2023-09-27 PROCEDURE — 90471 IMMUNIZATION ADMIN: CPT | Performed by: INTERNAL MEDICINE

## 2023-09-27 PROCEDURE — 99396 PREV VISIT EST AGE 40-64: CPT | Mod: 25 | Performed by: INTERNAL MEDICINE

## 2023-09-27 RX ORDER — LEVOTHYROXINE SODIUM 150 UG/1
150 TABLET ORAL DAILY
Qty: 90 TABLET | Refills: 1 | Status: SHIPPED | OUTPATIENT
Start: 2023-09-27 | End: 2024-05-30

## 2023-09-27 ASSESSMENT — ENCOUNTER SYMPTOMS
HEMATOCHEZIA: 0
SORE THROAT: 0
NAUSEA: 0
JOINT SWELLING: 0
HEMATURIA: 0
EYE PAIN: 0
CONSTIPATION: 0
HEARTBURN: 0
CHILLS: 0
DYSURIA: 0
DIARRHEA: 0
COUGH: 0
PALPITATIONS: 0
FREQUENCY: 0
SHORTNESS OF BREATH: 0
PARESTHESIAS: 0
BREAST MASS: 0
MYALGIAS: 0
NERVOUS/ANXIOUS: 0
FEVER: 0
DIZZINESS: 0
WEAKNESS: 0
ABDOMINAL PAIN: 0
ARTHRALGIAS: 0
HEADACHES: 0

## 2023-09-27 ASSESSMENT — PAIN SCALES - GENERAL: PAINLEVEL: NO PAIN (0)

## 2023-09-27 NOTE — PROGRESS NOTES
SUBJECTIVE:   CC: Janae is an 43 year old who presents for preventive health visit.       2023     2:09 PM   Additional Questions   Roomed by kang james   Accompanied by n/a         2023     2:09 PM   Patient Reported Additional Medications   Patient reports taking the following new medications n/a       Healthy Habits:     Getting at least 3 servings of Calcium per day:  NO    Bi-annual eye exam:  Yes    Dental care twice a year:  Yes    Sleep apnea or symptoms of sleep apnea:  None    Diet:  Regular (no restrictions), Carbohydrate counting and Other    Frequency of exercise:  1 day/week    Duration of exercise:  30-45 minutes    Taking medications regularly:  Yes    Medication side effects:  Not applicable    Additional concerns today:  No      Today's PHQ-2 Score:       2023     1:58 PM   PHQ-2 (  [employer])   Q1: Little interest or pleasure in doing things 0   Q2: Feeling down, depressed or hopeless 0   PHQ-2 Score 0   Q1: Little interest or pleasure in doing things Not at all   Q2: Feeling down, depressed or hopeless Not at all   PHQ-2 Score 0         Social History     Tobacco Use    Smoking status: Never    Smokeless tobacco: Never   Substance Use Topics    Alcohol use: Yes     Alcohol/week: 0.0 standard drinks of alcohol     Comment: Rare           2023     8:14 AM   Alcohol Use   Prescreen: >3 drinks/day or >7 drinks/week? No     Reviewed orders with patient.  Reviewed health maintenance and updated orders accordingly - Yes  Labs reviewed in EPIC    Breast Cancer Screenin/29/2022     7:17 AM   Breast CA Risk Assessment (FHS-7)   Do you have a family history of breast, colon, or ovarian cancer? No / Unknown       Mammogram Screening: Recommended annual mammography  Pertinent mammograms are reviewed under the imaging tab.    History of abnormal Pap smear: NO - age 30-65 PAP every 5 years with negative HPV co-testing recommended      Latest Ref Rng & Units 3/1/2019     8:12 PM  "3/1/2019     3:07 PM 3/23/2016    12:00 AM   PAP / HPV   PAP (Historical)   NIL  NIL    HPV 16 DNA NEG^Negative Negative      HPV 18 DNA NEG^Negative Negative      Other HR HPV NEG^Negative Negative        Reviewed and updated as needed this visit by clinical staff   Tobacco  Allergies  Meds              Reviewed and updated as needed this visit by Provider                     Review of Systems   Constitutional:  Negative for chills and fever.   HENT:  Negative for congestion, ear pain, hearing loss and sore throat.    Eyes:  Positive for visual disturbance. Negative for pain.   Respiratory:  Negative for cough and shortness of breath.    Cardiovascular:  Negative for chest pain, palpitations and peripheral edema.   Gastrointestinal:  Negative for abdominal pain, constipation, diarrhea, heartburn, hematochezia and nausea.   Breasts:  Negative for tenderness, breast mass and discharge.   Genitourinary:  Positive for urgency. Negative for dysuria, frequency, genital sores, hematuria, pelvic pain, vaginal bleeding and vaginal discharge.   Musculoskeletal:  Negative for arthralgias, joint swelling and myalgias.   Skin:  Negative for rash.   Neurological:  Negative for dizziness, weakness, headaches and paresthesias.   Psychiatric/Behavioral:  Negative for mood changes. The patient is not nervous/anxious.         OBJECTIVE:   /62 (BP Location: Right arm, Patient Position: Sitting, Cuff Size: Adult Regular)   Pulse 62   Temp 98  F (36.7  C) (Temporal)   Resp 16   Ht 1.722 m (5' 7.8\")   Wt 73.5 kg (162 lb 1.6 oz)   SpO2 100%   BMI 24.80 kg/m    Physical Exam  GENERAL: healthy, alert and no distress  EYES: Eyes grossly normal to inspection, PERRL and conjunctivae and sclerae normal  NECK: no adenopathy, no asymmetry, masses, or scars and thyroid normal to palpation  RESP: lungs clear to auscultation - no rales, rhonchi or wheezes  CV: regular rate and rhythm, normal S1 S2, no S3 or S4, no murmur, click or " rub, no peripheral edema and peripheral pulses strong  ABDOMEN: soft, nontender, no hepatosplenomegaly, no masses and bowel sounds normal  MS: no gross musculoskeletal defects noted, no edema  SKIN: no suspicious lesions or rashes  NEURO: Normal strength and tone, mentation intact and speech normal  PSYCH: mentation appears normal, affect normal/bright    Diagnostic Test Results:  Labs reviewed in Epic    ASSESSMENT/PLAN:   1. Encounter for routine adult health examination without abnormal findings  Up to date with colonoscopy    2. Hypothyroidism due to Hashimoto's thyroiditis    - levothyroxine (SYNTHROID/LEVOTHROID) 150 MCG tablet; Take 1 tablet (150 mcg) by mouth daily New dose.  Dispense: 90 tablet; Refill: 1  - **TSH with free T4 reflex FUTURE 2mo; Future        COUNSELING:  Reviewed preventive health counseling, as reflected in patient instructions        She reports that she has never smoked. She has never used smokeless tobacco.          Jossy Barron MD  [facility]

## 2024-03-29 ENCOUNTER — PATIENT OUTREACH (OUTPATIENT)
Dept: CARE COORDINATION | Facility: CLINIC | Age: 44
End: 2024-03-29
Payer: COMMERCIAL

## 2024-04-29 ENCOUNTER — HOSPITAL ENCOUNTER (OUTPATIENT)
Dept: MAMMOGRAPHY | Facility: CLINIC | Age: 44
Discharge: HOME | End: 2024-04-29
Attending: INTERNAL MEDICINE | Admitting: INTERNAL MEDICINE
Payer: COMMERCIAL

## 2024-04-29 DIAGNOSIS — Z12.31 VISIT FOR SCREENING MAMMOGRAM: ICD-10-CM

## 2024-04-29 PROCEDURE — 77063 BREAST TOMOSYNTHESIS BI: CPT

## 2024-05-15 ENCOUNTER — LAB (OUTPATIENT)
Dept: LAB | Facility: CLINIC | Age: 44
End: 2024-05-15
Payer: COMMERCIAL

## 2024-05-15 DIAGNOSIS — Z13.1 SCREENING FOR DIABETES MELLITUS: Primary | ICD-10-CM

## 2024-05-15 DIAGNOSIS — E06.3 HASHIMOTO'S THYROIDITIS: ICD-10-CM

## 2024-05-15 PROCEDURE — 36415 COLL VENOUS BLD VENIPUNCTURE: CPT

## 2024-05-15 PROCEDURE — 82947 ASSAY GLUCOSE BLOOD QUANT: CPT

## 2024-05-15 PROCEDURE — 84443 ASSAY THYROID STIM HORMONE: CPT

## 2024-05-16 LAB
FASTING STATUS PATIENT QL REPORTED: NO
GLUCOSE SERPL-MCNC: 75 MG/DL (ref 70–99)
TSH SERPL DL<=0.005 MIU/L-ACNC: 0.53 UIU/ML (ref 0.3–4.2)

## 2024-05-30 DIAGNOSIS — E06.3 HYPOTHYROIDISM DUE TO HASHIMOTO'S THYROIDITIS: ICD-10-CM

## 2024-05-30 RX ORDER — LEVOTHYROXINE SODIUM 150 UG/1
150 TABLET ORAL DAILY
Qty: 90 TABLET | Refills: 0 | Status: SHIPPED | OUTPATIENT
Start: 2024-05-30 | End: 2024-09-06

## 2024-09-06 ENCOUNTER — MYC REFILL (OUTPATIENT)
Dept: PEDIATRICS | Facility: CLINIC | Age: 44
End: 2024-09-06
Payer: COMMERCIAL

## 2024-09-06 DIAGNOSIS — E06.3 HYPOTHYROIDISM DUE TO HASHIMOTO'S THYROIDITIS: ICD-10-CM

## 2024-09-06 RX ORDER — LEVOTHYROXINE SODIUM 150 UG/1
150 TABLET ORAL DAILY
Qty: 90 TABLET | Refills: 0 | Status: SHIPPED | OUTPATIENT
Start: 2024-09-06

## 2024-09-29 SDOH — HEALTH STABILITY: PHYSICAL HEALTH: ON AVERAGE, HOW MANY MINUTES DO YOU ENGAGE IN EXERCISE AT THIS LEVEL?: 60 MIN

## 2024-09-29 SDOH — HEALTH STABILITY: PHYSICAL HEALTH: ON AVERAGE, HOW MANY DAYS PER WEEK DO YOU ENGAGE IN MODERATE TO STRENUOUS EXERCISE (LIKE A BRISK WALK)?: 3 DAYS

## 2024-09-29 ASSESSMENT — SOCIAL DETERMINANTS OF HEALTH (SDOH): HOW OFTEN DO YOU GET TOGETHER WITH FRIENDS OR RELATIVES?: THREE TIMES A WEEK

## 2024-09-30 ENCOUNTER — OFFICE VISIT (OUTPATIENT)
Dept: PEDIATRICS | Facility: CLINIC | Age: 44
End: 2024-09-30
Payer: COMMERCIAL

## 2024-09-30 VITALS
WEIGHT: 155.7 LBS | SYSTOLIC BLOOD PRESSURE: 97 MMHG | OXYGEN SATURATION: 100 % | RESPIRATION RATE: 14 BRPM | HEART RATE: 56 BPM | DIASTOLIC BLOOD PRESSURE: 61 MMHG | TEMPERATURE: 97.7 F | BODY MASS INDEX: 23.6 KG/M2 | HEIGHT: 68 IN

## 2024-09-30 DIAGNOSIS — Z12.4 CERVICAL CANCER SCREENING: ICD-10-CM

## 2024-09-30 DIAGNOSIS — E06.3 HYPOTHYROIDISM DUE TO HASHIMOTO'S THYROIDITIS: ICD-10-CM

## 2024-09-30 DIAGNOSIS — Z00.00 ROUTINE GENERAL MEDICAL EXAMINATION AT A HEALTH CARE FACILITY: Primary | ICD-10-CM

## 2024-09-30 DIAGNOSIS — G47.9 SLEEP DISTURBANCE: ICD-10-CM

## 2024-09-30 DIAGNOSIS — N39.46 MIXED STRESS AND URGE URINARY INCONTINENCE: ICD-10-CM

## 2024-09-30 DIAGNOSIS — Z13.21 ENCOUNTER FOR VITAMIN DEFICIENCY SCREENING: ICD-10-CM

## 2024-09-30 LAB
CHOLEST SERPL-MCNC: 198 MG/DL
FASTING STATUS PATIENT QL REPORTED: YES
HDLC SERPL-MCNC: 67 MG/DL
LDLC SERPL CALC-MCNC: 122 MG/DL
NONHDLC SERPL-MCNC: 131 MG/DL
T4 FREE SERPL-MCNC: 2.23 NG/DL (ref 0.9–1.7)
TRIGL SERPL-MCNC: 45 MG/DL
TSH SERPL DL<=0.005 MIU/L-ACNC: 0.13 UIU/ML (ref 0.3–4.2)
VIT D+METAB SERPL-MCNC: 29 NG/ML (ref 20–50)

## 2024-09-30 PROCEDURE — G0145 SCR C/V CYTO,THINLAYER,RESCR: HCPCS | Performed by: INTERNAL MEDICINE

## 2024-09-30 PROCEDURE — 84439 ASSAY OF FREE THYROXINE: CPT | Performed by: INTERNAL MEDICINE

## 2024-09-30 PROCEDURE — 90471 IMMUNIZATION ADMIN: CPT | Performed by: INTERNAL MEDICINE

## 2024-09-30 PROCEDURE — 99396 PREV VISIT EST AGE 40-64: CPT | Mod: 25 | Performed by: INTERNAL MEDICINE

## 2024-09-30 PROCEDURE — 82306 VITAMIN D 25 HYDROXY: CPT | Performed by: INTERNAL MEDICINE

## 2024-09-30 PROCEDURE — 90656 IIV3 VACC NO PRSV 0.5 ML IM: CPT | Performed by: INTERNAL MEDICINE

## 2024-09-30 PROCEDURE — 90480 ADMN SARSCOV2 VAC 1/ONLY CMP: CPT | Performed by: INTERNAL MEDICINE

## 2024-09-30 PROCEDURE — 84443 ASSAY THYROID STIM HORMONE: CPT | Performed by: INTERNAL MEDICINE

## 2024-09-30 PROCEDURE — 91320 SARSCV2 VAC 30MCG TRS-SUC IM: CPT | Performed by: INTERNAL MEDICINE

## 2024-09-30 PROCEDURE — 80061 LIPID PANEL: CPT | Performed by: INTERNAL MEDICINE

## 2024-09-30 PROCEDURE — 87624 HPV HI-RISK TYP POOLED RSLT: CPT | Performed by: INTERNAL MEDICINE

## 2024-09-30 PROCEDURE — 36415 COLL VENOUS BLD VENIPUNCTURE: CPT | Performed by: INTERNAL MEDICINE

## 2024-09-30 ASSESSMENT — PAIN SCALES - GENERAL: PAINLEVEL: NO PAIN (0)

## 2024-09-30 NOTE — PROGRESS NOTES
Preventive Care Visit  [facility] Barron MD, Internal Medicine  Sep 30, 2024      Assessment & Plan     Routine general medical examination at a health care facility    - HPV and Gynecologic Cytology Panel - Recommended Age 30 - 65 Years  - Lipid panel reflex to direct LDL Fasting; Future  - MA Screen Bilateral w/Carlos; Future  - Vitamin D Deficiency; Future  - Lipid panel reflex to direct LDL Fasting  - Vitamin D Deficiency    Hypothyroidism due to Hashimoto's thyroiditis    - TSH with free T4 reflex; Future  - TSH with free T4 reflex    Mixed stress and urge urinary incontinence  Would like referral to urogyn. Symptoms increasing in frequency and severity  - HPV and Gynecologic Cytology Panel - Recommended Age 30 - 65 Years  - Adult Uro/Gyn  Referral; Future    Sleep disturbance  Stable. Discussed sleep hygiene    Cervical cancer screening      Encounter for vitamin deficiency screening        Counseling  Appropriate preventive services were addressed with this patient via screening, questionnaire, or discussion as appropriate for fall prevention, nutrition, physical activity, Tobacco-use cessation, social engagement, weight loss and cognition.  Checklist reviewing preventive services available has been given to the patient.  Reviewed patient's diet, addressing concerns and/or questions.   She is at risk for lack of exercise and has been provided with information to increase physical activity for the benefit of her well-being.   She is at risk for psychosocial distress and has been provided with information to reduce risk.       See Patient Instructions    Sherrie Cardoso is a 44 year old, presenting for the following:  Physical        9/30/2024     7:29 AM   Additional Questions   Roomed by KM   Accompanied by Self         9/30/2024     7:29 AM   Patient Reported Additional Medications   Patient reports taking the following new medications None            HPI    Wakes  up several times a night for the last year. Able to fall back to sleep in general.    Questions about menopause.  Currently just over 5 years on Mirena. Also using condoms.            9/29/2024   General Health   How would you rate your overall physical health? Good   Feel stress (tense, anxious, or unable to sleep) Only a little      (!) STRESS CONCERN      9/29/2024   Nutrition   Three or more servings of calcium each day? Yes   Diet: Regular (no restrictions)   How many servings of fruit and vegetables per day? (!) 2-3   How many sweetened beverages each day? 0-1            9/29/2024   Exercise   Days per week of moderate/strenous exercise 3 days   Average minutes spent exercising at this level 60 min            9/29/2024   Social Factors   Frequency of gathering with friends or relatives Three times a week   Worry food won't last until get money to buy more No   Food not last or not have enough money for food? No   Do you have housing? (Housing is defined as stable permanent housing and does not include staying ouside in a car, in a tent, in an abandoned building, in an overnight shelter, or couch-surfing.) Yes   Are you worried about losing your housing? No   Lack of transportation? No   Unable to get utilities (heat,electricity)? No            9/29/2024   Dental   Dentist two times every year? Yes            9/29/2024   TB Screening   Were you born outside of the [place]? No            Today's PHQ-2 Score:       9/29/2024    10:32 AM   PHQ-2 ( 1999 Pfizer)   Q1: Little interest or pleasure in doing things 0   Q2: Feeling down, depressed or hopeless 0   PHQ-2 Score 0   Q1: Little interest or pleasure in doing things Not at all   Q2: Feeling down, depressed or hopeless Not at all   PHQ-2 Score 0           9/29/2024   Substance Use   Alcohol more than 3/day or more than 7/wk No   Do you use any other substances recreationally? No        Social History     Tobacco Use    Smoking status: Never     Passive exposure:  Never    Smokeless tobacco: Never   Vaping Use    Vaping status: Never Used   Substance Use Topics    Alcohol use: Yes     Alcohol/week: 0.0 standard drinks of alcohol     Comment: Rare    Drug use: No           4/29/2024   LAST FHS-7 RESULTS   1st degree relative breast or ovarian cancer No   Any relative bilateral breast cancer No   Any male have breast cancer No   Any ONE woman have BOTH breast AND ovarian cancer No   Any woman with breast cancer before 50yrs No   2 or more relatives with breast AND/OR ovarian cancer No   2 or more relatives with breast AND/OR bowel cancer No           Mammogram Screening - Mammogram every 1-2 years updated in Health Maintenance based on mutual decision making        9/29/2024   STI Screening   New sexual partner(s) since last STI/HIV test? No        History of abnormal Pap smear: No - age 30-64 HPV with reflex Pap every 5 years recommended        Latest Ref Rng & Units 3/1/2019     8:12 PM 3/1/2019     3:07 PM 3/23/2016    12:00 AM   PAP / HPV   PAP (Historical)   NIL  NIL    HPV 16 DNA NEG^Negative Negative      HPV 18 DNA NEG^Negative Negative      Other HR HPV NEG^Negative Negative        ASCVD Risk   The 10-year ASCVD risk score (Noe CASTANEDA, et al., 2019) is: 0.3%    Values used to calculate the score:      Age: 44 years      Sex: Female      Is Non- : No      Diabetic: No      Tobacco smoker: No      Systolic Blood Pressure: 97 mmHg      Is BP treated: No      HDL Cholesterol: 84 mg/dL      Total Cholesterol: 217 mg/dL        9/29/2024   Contraception/Family Planning   Questions about contraception or family planning (!) YES IUD in for 5 years, how long to leave in.           Reviewed and updated as needed this visit by Provider                    Lab work is in process  Labs reviewed in EPIC      Review of Systems  Constitutional, HEENT, cardiovascular, pulmonary, gi and gu systems are negative, except as otherwise noted.     Objective   "  Exam  BP 97/61 (BP Location: Right arm, Patient Position: Sitting, Cuff Size: Adult Regular)   Pulse 56   Temp 97.7  F (36.5  C) (Tympanic)   Resp 14   Ht 1.734 m (5' 8.25\")   Wt 70.6 kg (155 lb 11.2 oz)   LMP 09/20/2024   SpO2 100%   BMI 23.50 kg/m     Estimated body mass index is 23.5 kg/m  as calculated from the following:    Height as of this encounter: 1.734 m (5' 8.25\").    Weight as of this encounter: 70.6 kg (155 lb 11.2 oz).    Physical Exam  GENERAL: alert and no distress  EYES: Eyes grossly normal to inspection, PERRL and conjunctivae and sclerae normal  HENT: ear canals and TM's normal, nose and mouth without ulcers or lesions  NECK: no adenopathy, no asymmetry, masses, or scars  RESP: lungs clear to auscultation - no rales, rhonchi or wheezes  CV: regular rate and rhythm, normal S1 S2, no S3 or S4, no murmur, click or rub, no peripheral edema  ABDOMEN: soft, nontender, no hepatosplenomegaly, no masses and bowel sounds normal   (female) w/bimanual: normal female external genitalia, normal urethral meatus, normal vaginal mucosa, and normal cervix/adnexa/uterus without masses or discharge  MS: no gross musculoskeletal defects noted, no edema  SKIN: no suspicious lesions or rashes  NEURO: Normal strength and tone, mentation intact and speech normal  PSYCH: mentation appears normal, affect normal/bright      Signed Electronically by: Jossy Barron MD    "

## 2024-09-30 NOTE — PATIENT INSTRUCTIONS
Patient Education   Preventive Care Advice   This is general advice given by our system to help you stay healthy. However, your care team may have specific advice just for you. Please talk to your care team about your preventive care needs.  Nutrition  Eat 5 or more servings of fruits and vegetables each day.  Try wheat bread, brown rice and whole grain pasta (instead of white bread, rice, and pasta).  Get enough calcium and vitamin D. Check the label on foods and aim for 100% of the RDA (recommended daily allowance).  Lifestyle  Exercise at least 150 minutes each week  (30 minutes a day, 5 days a week).  Do muscle strengthening activities 2 days a week. These help control your weight and prevent disease.  No smoking.  Wear sunscreen to prevent skin cancer.  Have a dental exam and cleaning every 6 months.  Yearly exams  See your health care team every year to talk about:  Any changes in your health.  Any medicines your care team has prescribed.  Preventive care, family planning, and ways to prevent chronic diseases.  Shots (vaccines)   HPV shots (up to age 26), if you've never had them before.  Hepatitis B shots (up to age 59), if you've never had them before.  COVID-19 shot: Get this shot when it's due.  Flu shot: Get a flu shot every year.  Tetanus shot: Get a tetanus shot every 10 years.  Pneumococcal, hepatitis A, and RSV shots: Ask your care team if you need these based on your risk.  Shingles shot (for age 50 and up)  General health tests  Diabetes screening:  Starting at age 35, Get screened for diabetes at least every 3 years.  If you are younger than age 35, ask your care team if you should be screened for diabetes.  Cholesterol test: At age 39, start having a cholesterol test every 5 years, or more often if advised.  Bone density scan (DEXA): At age 50, ask your care team if you should have this scan for osteoporosis (brittle bones).  Hepatitis C: Get tested at least once in your life.  STIs (sexually  transmitted infections)  Before age 24: Ask your care team if you should be screened for STIs.  After age 24: Get screened for STIs if you're at risk. You are at risk for STIs (including HIV) if:  You are sexually active with more than one person.  You don't use condoms every time.  You or a partner was diagnosed with a sexually transmitted infection.  If you are at risk for HIV, ask about PrEP medicine to prevent HIV.  Get tested for HIV at least once in your life, whether you are at risk for HIV or not.  Cancer screening tests  Cervical cancer screening: If you have a cervix, begin getting regular cervical cancer screening tests starting at age 21.  Breast cancer scan (mammogram): If you've ever had breasts, begin having regular mammograms starting at age 40. This is a scan to check for breast cancer.  Colon cancer screening: It is important to start screening for colon cancer at age 45.  Have a colonoscopy test every 10 years (or more often if you're at risk) Or, ask your provider about stool tests like a FIT test every year or Cologuard test every 3 years.  To learn more about your testing options, visit:   .  For help making a decision, visit:   https://bit.ly/zt10051.  Prostate cancer screening test: If you have a prostate, ask your care team if a prostate cancer screening test (PSA) at age 55 is right for you.  Lung cancer screening: If you are a current or former smoker ages 50 to 80, ask your care team if ongoing lung cancer screenings are right for you.  For informational purposes only. Not to replace the advice of your health care provider. Copyright   2023 [employer] [facility] [employer]. All rights reserved. Clinically reviewed by the [facility]. [employer] 403710 - REV 01/24.

## 2024-10-01 LAB
HPV HR 12 DNA CVX QL NAA+PROBE: NEGATIVE
HPV16 DNA CVX QL NAA+PROBE: NEGATIVE
HPV18 DNA CVX QL NAA+PROBE: NEGATIVE
HUMAN PAPILLOMA VIRUS FINAL DIAGNOSIS: NORMAL

## 2024-10-03 ENCOUNTER — MYC MEDICAL ADVICE (OUTPATIENT)
Dept: PEDIATRICS | Facility: CLINIC | Age: 44
End: 2024-10-03
Payer: COMMERCIAL

## 2024-10-03 DIAGNOSIS — E06.3 HYPOTHYROIDISM DUE TO HASHIMOTO THYROIDITIS: Primary | ICD-10-CM

## 2024-10-03 LAB
BKR AP ASSOCIATED HPV REPORT: NORMAL
BKR LAB AP GYN ADEQUACY: NORMAL
BKR LAB AP GYN INTERPRETATION: NORMAL
BKR LAB AP LMP: NORMAL
BKR LAB AP PREVIOUS ABNORMAL: NORMAL
PATH REPORT.COMMENTS IMP SPEC: NORMAL
PATH REPORT.COMMENTS IMP SPEC: NORMAL
PATH REPORT.RELEVANT HX SPEC: NORMAL

## 2024-10-03 NOTE — TELEPHONE ENCOUNTER
Dr. Barron- patient is concerned about the high T4 but normal/low TSH. Additionally she would like to know if she has too high a dose with the high T4 lab results. (See MyChart message).    Please advise.   Jessi Lei RN

## 2024-10-04 RX ORDER — LEVOTHYROXINE SODIUM 137 UG/1
137 TABLET ORAL DAILY
Qty: 30 TABLET | Refills: 2 | Status: SHIPPED | OUTPATIENT
Start: 2024-10-04

## 2024-10-24 NOTE — TELEPHONE ENCOUNTER
MEDICAL RECORDS REQUEST   [facility]  Urology Clinic  [address] [place] [address]  PHONE: [phone]  Fax: [phone]        FUTURE VISIT INFORMATION                                                   Janae Stanton, : 1980 scheduled for future visit at [facility]    APPOINTMENT INFORMATION:  Date: 2024  Provider:  Dr. Robin Sepulveda  Reason for Visit/Diagnosis: Incontinence    REFERRAL INFORMATION:  Referring provider: Dr. Jossy Barron  Specialty: Primary Care  Referring providers clinic:  [facility]    RECORDS REQUESTED FOR VISIT                                                     NOTES  STATUS/DETAILS   OFFICE NOTE from referring provider  yes  [facility]:  24, 20 - PCC OV with Dr. Barron   OFFICE NOTE from other specialist  yes  [facility]:  20 - OBGYN OV with Dr. Allen  20 - UC OV with FRANCO Escalante   DISCHARGE SUMMARY from hospital  no   DISCHARGE REPORT from the ER  no   OPERATIVE REPORT  no   MEDICATION LIST  yes   LABS     URINALYSIS (UA)  yes  [facility]:  20 - UA   NEUROGENIC BLADDER     CT ABDOMEN/PELVIS (IMAGES & REPORT)  yes  [facility]:  20 - CT Abd/Pelvis   LABS (CMP, RENAL PANEL, CBC)  yes  [facility]:  18 - CBC  18 - CMP     PRE-VISIT CHECKLIST      Joint diagnostic appointment coordinated correctly          (ensure right order & amount of time) Yes   RECORD COLLECTION COMPLETE Yes

## 2024-12-26 ENCOUNTER — PRE VISIT (OUTPATIENT)
Dept: UROLOGY | Facility: CLINIC | Age: 44
End: 2024-12-26

## 2024-12-26 ENCOUNTER — OFFICE VISIT (OUTPATIENT)
Dept: UROLOGY | Facility: CLINIC | Age: 44
End: 2024-12-26
Payer: COMMERCIAL

## 2024-12-26 VITALS
OXYGEN SATURATION: 98 % | WEIGHT: 150 LBS | BODY MASS INDEX: 24.11 KG/M2 | DIASTOLIC BLOOD PRESSURE: 61 MMHG | SYSTOLIC BLOOD PRESSURE: 89 MMHG | HEIGHT: 66 IN

## 2024-12-26 DIAGNOSIS — N39.46 MIXED STRESS AND URGE URINARY INCONTINENCE: Primary | ICD-10-CM

## 2024-12-26 RX ORDER — ESTRADIOL 0.1 MG/G
CREAM VAGINAL
Qty: 42.5 G | Refills: 11 | Status: CANCELLED | OUTPATIENT
Start: 2024-12-26

## 2024-12-26 ASSESSMENT — PAIN SCALES - GENERAL: PAINLEVEL_OUTOF10: NO PAIN (0)

## 2024-12-26 NOTE — NURSING NOTE
Stress test:   Patient leaked about 2 table spoons of urine when asked to bear down as if passing a bowel movement. This was first test performed. No jumping, squatting, or other tasks performed today.     Hat test:   Patient voided approximately 400 mls of urine.     PVR:   PVR test conducted at bedside. 0 mls or residual urine.     Tests conducted as requested by Dr. Derick MD.     Cherie Moreno   12/26/24   8:30 am

## 2024-12-26 NOTE — LETTER
2024       RE: Janae Stanton  [address] [facility] [address]     Dear Colleague,    Thank you for referring your patient, Janae Stanton, to the [facility] [place] at [facility]. Please see a copy of my visit note below.    2024    Referring Provider: Jossy Barron MD  [address] [place] [address]    Primary Care Provider: Jossy Barron    CC: urinary leaking    HPI:  Janae Stanton is a 44 year old adult  with no significant surgical history and medical history significant for Hashimoto's thyroiditis/hypothyroidism who presents for evaluation of urinary incontinence. Onset 5-8 years ago.      She experienced transient urinary leaking after birth of children which resolved with kegel exercises. History of 4th degree tear with vacuum extraction. No genital or pelvic pain today or in general    Her goal of care is to stop leaking urine when she runs, coughs, sneezes or makes sudden movements; also to have better control of the less common sudden urgency    Urinary Symptoms/Voiding function  - leaks with sneeze cough laugh and running  - 1-2 yrs of urgency - notes one episode of leaking upon waking up and not getting to bathroom fast enough  - no pain with peeing or sex   - has already eliminated all bladder triggers: no caffeine or artificial sweeteners, no carbonated beverages     Pelvic Organ Prolapse Symptoms  none    Gastrointestinal Symptoms:  - chronic constipation with decreased motility (father with   - takes turmeric; has not taken miralax yet   - splints with fingers at perineum - and often feels sense of incomplete emptying     Sexual function/Pelvic floor pain/GYN:   - pain with deep penetration x years not distressing, resolves with position change     Relevant Medical History:    Diabetes? no  High Blood pressure? no     Recurrent UTIs? No, 1 E coli confirmed UTI in    Sleep Apnea? no  Obesity? no  History of Blood clots? no  Other medical problems: Hashimoto's hypothytroidism    Surgical History:    Past Surgical History:   Procedure Laterality Date     NO HISTORY OF SURGERY       OB/Gyn History:  OB History    Para Term  AB Living   5 3 3 0 1 3   SAB IAB Ectopic Multiple Live Births   1 0 0 0 1      # Outcome Date GA Lbr Jermaine/2nd Weight Sex Type Anes PTL Lv   5  05 40w0d 12:00 4.338 kg (9 lb 9 oz) M    QUINTEN      Name: Renaldo   4 SAB            3 Term            2 Term            1 Term              Medical History:      Past Medical History:   Diagnosis Date     ALLERGIC RHINITIS  2000     CHRONIC SINUSITIS      Hashimoto's thyroiditis 10/18/2016     Hypothyroid      NONSPECIF SKIN ERUPT NEC 2007     SUPERVIS OTHER NORMAL PREG 2005     Review Of Systems  Respiratory: No shortness of breath, dyspnea on exertion, cough, or hemoptysis  Cardiovascular: negative, palpitations, tachycardia, irregular heart beat, chest pain, and dyspnea on exertion  Gastrointestinal: positive for chronic constipation. Hard formed stools, must strain and splint to move bowel. Has sense of incomplete bowel emptying. 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    Social History    Social History     Socioeconomic History     Marital status:      Spouse name: Marky     Number of children: 0     Years of education: 16     Highest education level: Not on file   Occupational History     Occupation: nurse     Employer: [employer]   Tobacco Use     Smoking status: Never     Passive exposure: Never     Smokeless tobacco: Never   Vaping Use     Vaping status: Never Used   Substance and Sexual Activity     Alcohol use: Yes     Alcohol/week: 0.0 standard drinks of alcohol     Comment: Rare     Drug use: No      Sexual activity: Yes     Partners: Male     Birth control/protection: I.U.D.   Other Topics Concern     Parent/sibling w/ CABG, MI or angioplasty before 65F 55M? No   Social History Narrative     Not on file     Social Drivers of Health     Financial Resource Strain: Low Risk  (9/29/2024)    Financial Resource Strain      Within the past 12 months, have you or your family members you live with been unable to get utilities (heat, electricity) when it was really needed?: No   Food Insecurity: Low Risk  (9/29/2024)    Food Insecurity      Within the past 12 months, did you worry that your food would run out before you got money to buy more?: No      Within the past 12 months, did the food you bought just not last and you didn t have money to get more?: No   Transportation Needs: Low Risk  (9/29/2024)    Transportation Needs      Within the past 12 months, has lack of transportation kept you from medical appointments, getting your medicines, non-medical meetings or appointments, work, or from getting things that you need?: No   Physical Activity: Sufficiently Active (9/29/2024)    Exercise Vital Sign      Days of Exercise per Week: 3 days      Minutes of Exercise per Session: 60 min   Stress: No Stress Concern Present (9/29/2024)    [place] [employer] [employer] - Occupational Stress Questionnaire      Feeling of Stress : Only a little   Social Connections: Unknown (9/29/2024)    Social Connection and Isolation Panel [NHANES]      Frequency of Communication with Friends and Family: Not on file      Frequency of Social Gatherings with Friends and Family: Three times a week      Attends Catholic Services: Not on file      Active Member of Clubs or Organizations: Not on file      Attends Club or Organization Meetings: Not on file      Marital Status: Not on file   Interpersonal Safety: Low Risk  (9/30/2024)    Interpersonal Safety      Do you feel physically and emotionally safe where you currently live?: Yes  "     Within the past 12 months, have you been hit, slapped, kicked or otherwise physically hurt by someone?: No      Within the past 12 months, have you been humiliated or emotionally abused in other ways by your partner or ex-partner?: No   Housing Stability: Low Risk  (2024)    Housing Stability      Do you have housing? : Yes      Are you worried about losing your housing?: No     Family History  Family History   Problem Relation Age of Onset     Hypertension Mother         alive age 59  DM, lipids     Depression Father         alive age 61 PTST     Thyroid Disease Sister         alive age 32     Family History Negative Sister         alive age  30 sinus problems     Depression Sister         alive age  27     Heart Disease Paternal Grandmother              Heart Disease Maternal Grandmother              Thyroid Disease Maternal Grandfather      Breast Cancer No family hx of      Ovarian Cancer No family hx of      Allergy    Allergies   Allergen Reactions     Sulfa Antibiotics      GI issues     Current Outpatient Medications   Medication Sig Dispense Refill     cholecalciferol 25 MCG (1000 UT) TABS Take 1 tablet by mouth daily (Patient not taking: Reported on 2023) 90 tablet 0     levonorgestrel (MIRENA) 20 MCG/24HR IUD 1 each (20 mcg) by Intrauterine route once for 1 dose Started 2013 1 each 0     levothyroxine (SYNTHROID/LEVOTHROID) 137 MCG tablet Take 1 tablet (137 mcg) by mouth daily. 30 tablet 2     levothyroxine (SYNTHROID/LEVOTHROID) 150 MCG tablet Take 1 tablet (150 mcg) by mouth daily. New dose. 90 tablet 0     No current facility-administered medications for this visit.     Physical Exam:   BP (!) 89/61 (BP Location: Left arm, Patient Position: Sitting, Cuff Size: Adult Regular)   Ht 1.676 m (5' 6\")   Wt 68 kg (150 lb)   SpO2 98%   BMI 24.21 kg/m    Gen:  alert, comfortable in no acute distress,   Abdomen: Abdomen is soft, non-tender, non-distended,   Lungs: " non-labored breathing    Pelvic Exam:   Pelvic Exam:  Q tip test: negative for vulvodynia, negative for vestibulodynia  Clitoris: herman mobile with no adhesions or phimosis  Vulva: No external lesions, normal hair distribution, normal architecture, no hypopigmentation or skin changes  Vestibule: mucosa pink moist intact with no lesions, swelling or injury  Vagina: Moist, pink, no abnormal discharge, well rugated, no lesions  Cervix: smooth, pink, no visible lesions  Uterus: Normal size, anteverted, non-tender, mobile  Adnexa: Non-tender, no masses  Anus: skin intact with no lesions, fissures or external hemorrhoids  Rectum: JOHNNA reveals smooth, intact mucosa with no lesions, masses, palpable hemorrhoids or posterior wall pouching    Pelvic floor strength: 3/5 kegels.    Pelvic floor muscles: no hypertonicity, full contraction and relaxation     Voiding trial:    POPQ EXAM FOR PROLAPSE SEVERITY  (Aa):   -1 (Ba):   -1 (C ):    -7   (GH):   3 (PB):  2 (TVL):  9   (Ap):   0 (Bp):  0 (D):   -9     ICS Stage (1-4):  2 posterior wall, UVJ hypermobility    VOID 400 ml  PVR 0 mL by Bladder ultrasound  Leak with Cough stress test : yes    Labs:   Color Urine (no units)   Date Value   08/28/2020 Yellow     Appearance Urine (no units)   Date Value   08/28/2020 Clear     Glucose Urine (mg/dL)   Date Value   08/28/2020 Negative     Bilirubin Urine (no units)   Date Value   08/28/2020 Negative     Ketones Urine (mg/dL)   Date Value   08/28/2020 Negative     Specific Gravity Urine (no units)   Date Value   08/28/2020 1.015     pH Urine (pH)   Date Value   08/28/2020 6.0     Protein Albumin Urine (mg/dL)   Date Value   08/28/2020 Negative     Urobilinogen Urine (EU/dL)   Date Value   08/28/2020 0.2     Nitrite Urine (no units)   Date Value   08/28/2020 Negative     Leukocyte Esterase Urine (no units)   Date Value   08/28/2020 Trace (A)     CBC RESULTS:   Recent Labs   Lab Test 01/17/18  0811   WBC 5.0   RBC 4.40   HGB 13.1   HCT 39.1    MCV 89   MCH 29.8   MCHC 33.5   RDW 12.0        A/P: Janae Stanton is a 44 year old F with mixed urinary incontinence.     Given posterior wall bulge, constipation and manual splinting with defecation, we will refer to PFPT in order to address this as well as the mixed urge and stress incontinence.     We discussed the benefits of starting vaginal estrogen cream at this time as a supportive treatment to relieve urgency and Janae prefers to see what PT can improve before introducing any medications. We can always reconsider this in the future.      I spent a total of 45 minutes with  Janae Stanton  on the date of the encounter in chart review, face to face patient visit, review of tests, documentation and/or discussion with other providers about the issues documented above.     Veronica RAHMAN  Female Pelvic Medicine and Reconstructive Surgery ( Urogynecology )    I attest that I was present for the history and personally performed the physical exam and medical decision making and that I agree with the findings and treatment plan outlined above.     I spent a total of 45 minutes with  Ms. Stanton  on the date of the encounter in chart review, face to face patient visit, review of tests, documentation and/or discussion with other providers about the issues documented above.     Robin Sepulveda MD  Professor, OB/GYN  Urogynecologist    CC  Patient Care Team:  Jossy Barron MD as PCP - General (Internal Medicine)  Jossy Barron MD as Assigned PCP  Kaleigh Griffin MD as MD (INTERNAL MEDICINE - ENDOCRINOLOGY, DIABETES & METABOLISM)  Robin Sepulveda MD as MD (OB/Gyn)  JOSSY BARRON                 Again, thank you for allowing me to participate in the care of your patient.      Sincerely,    Robin Sepulveda MD

## 2024-12-26 NOTE — NURSING NOTE
"Chief Complaint   Patient presents with    Incontinence     No UTI symptoms today        Blood pressure (!) 89/61, height 1.676 m (5' 6\"), weight 68 kg (150 lb), SpO2 98%, not currently breastfeeding. Body mass index is 24.21 kg/m .    Patient Active Problem List   Diagnosis    H/O vitamin D deficiency    CARDIOVASCULAR SCREENING; LDL GOAL LESS THAN 160    Hypothyroidism    Hashimoto's thyroiditis    Encounter for removal and reinsertion of Mireana IUD: 5 years in place by 2/2024       Allergies   Allergen Reactions    Sulfa Antibiotics      GI issues       Current Outpatient Medications   Medication Sig Dispense Refill    cholecalciferol 25 MCG (1000 UT) TABS Take 1 tablet by mouth daily (Patient not taking: Reported on 9/27/2023) 90 tablet 0    levonorgestrel (MIRENA) 20 MCG/24HR IUD 1 each (20 mcg) by Intrauterine route once for 1 dose Started 11/2013 1 each 0    levothyroxine (SYNTHROID/LEVOTHROID) 137 MCG tablet Take 1 tablet (137 mcg) by mouth daily. 30 tablet 2    levothyroxine (SYNTHROID/LEVOTHROID) 150 MCG tablet Take 1 tablet (150 mcg) by mouth daily. New dose. 90 tablet 0       Social History     Tobacco Use    Smoking status: Never     Passive exposure: Never    Smokeless tobacco: Never   Vaping Use    Vaping status: Never Used   Substance Use Topics    Alcohol use: Yes     Alcohol/week: 0.0 standard drinks of alcohol     Comment: Rare    Drug use: No       Cherie Moreno  12/26/2024  8:11 AM     "

## 2024-12-26 NOTE — PROGRESS NOTES
2024    Referring Provider: Jossy Barron MD  [address] [place] [address]    Primary Care Provider: Jossy Barron    CC: urinary leaking    HPI:  Janae Stanton is a 44 year old adult  with no significant surgical history and medical history significant for Hashimoto's thyroiditis/hypothyroidism who presents for evaluation of urinary incontinence. Onset 5-8 years ago.      She experienced transient urinary leaking after birth of children which resolved with kegel exercises. History of 4th degree tear with vacuum extraction. No genital or pelvic pain today or in general    Her goal of care is to stop leaking urine when she runs, coughs, sneezes or makes sudden movements; also to have better control of the less common sudden urgency    Urinary Symptoms/Voiding function  - leaks with sneeze cough laugh and running  - 1-2 yrs of urgency - notes one episode of leaking upon waking up and not getting to bathroom fast enough  - no pain with peeing or sex   - has already eliminated all bladder triggers: no caffeine or artificial sweeteners, no carbonated beverages     Pelvic Organ Prolapse Symptoms  none    Gastrointestinal Symptoms:  - chronic constipation with decreased motility (father with   - takes turmeric; has not taken miralax yet   - splints with fingers at perineum - and often feels sense of incomplete emptying     Sexual function/Pelvic floor pain/GYN:   - pain with deep penetration x years not distressing, resolves with position change     Relevant Medical History:    Diabetes? no  High Blood pressure? no     Recurrent UTIs? No, 1 E coli confirmed UTI in   Sleep Apnea? no  Obesity? no  History of Blood clots? no  Other medical problems: Hashimoto's hypothytroidism    Surgical History:    Past Surgical History:   Procedure Laterality Date    NO HISTORY OF SURGERY       OB/Gyn History:  OB History    Para Term  AB Living   5 3 3 0 1 3   SAB IAB  Ectopic Multiple Live Births   1 0 0 0 1      # Outcome Date GA Lbr Jermaine/2nd Weight Sex Type Anes PTL Lv   5  05 40w0d 12:00 4.338 kg (9 lb 9 oz) M    QUINTEN      Name: Renaldo   4 SAB            3 Term            2 Term            1 Term              Medical History:      Past Medical History:   Diagnosis Date    ALLERGIC RHINITIS  2000    CHRONIC SINUSITIS     Hashimoto's thyroiditis 10/18/2016    Hypothyroid     NONSPECIF SKIN ERUPT NEC 2007    SUPERVIS OTHER NORMAL PREG 2005     Review Of Systems  Respiratory: No shortness of breath, dyspnea on exertion, cough, or hemoptysis  Cardiovascular: negative, palpitations, tachycardia, irregular heart beat, chest pain, and dyspnea on exertion  Gastrointestinal: positive for chronic constipation. Hard formed stools, must strain and splint to move bowel. Has sense of incomplete bowel emptying. 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    Social History    Social History     Socioeconomic History    Marital status:      Spouse name: Marky    Number of children: 0    Years of education: 16    Highest education level: Not on file   Occupational History    Occupation: nurse     Employer: [employer]   Tobacco Use    Smoking status: Never     Passive exposure: Never    Smokeless tobacco: Never   Vaping Use    Vaping status: Never Used   Substance and Sexual Activity    Alcohol use: Yes     Alcohol/week: 0.0 standard drinks of alcohol     Comment: Rare    Drug use: No    Sexual activity: Yes     Partners: Male     Birth control/protection: I.U.D.   Other Topics Concern    Parent/sibling w/ CABG, MI or angioplasty before 65F 55M? No   Social History Narrative    Not on file     Social Drivers of Health     Financial Resource Strain: Low Risk  (2024)    Financial Resource Strain     Within  the past 12 months, have you or your family members you live with been unable to get utilities (heat, electricity) when it was really needed?: No   Food Insecurity: Low Risk  (9/29/2024)    Food Insecurity     Within the past 12 months, did you worry that your food would run out before you got money to buy more?: No     Within the past 12 months, did the food you bought just not last and you didn t have money to get more?: No   Transportation Needs: Low Risk  (9/29/2024)    Transportation Needs     Within the past 12 months, has lack of transportation kept you from medical appointments, getting your medicines, non-medical meetings or appointments, work, or from getting things that you need?: No   Physical Activity: Sufficiently Active (9/29/2024)    Exercise Vital Sign     Days of Exercise per Week: 3 days     Minutes of Exercise per Session: 60 min   Stress: No Stress Concern Present (9/29/2024)    [employer] [place] [employer] - Occupational Stress Questionnaire     Feeling of Stress : Only a little   Social Connections: Unknown (9/29/2024)    Social Connection and Isolation Panel [NHANES]     Frequency of Communication with Friends and Family: Not on file     Frequency of Social Gatherings with Friends and Family: Three times a week     Attends [employer] Services: Not on file     Active Member of Clubs or Organizations: Not on file     Attends Club or Organization Meetings: Not on file     Marital Status: Not on file   Interpersonal Safety: Low Risk  (9/30/2024)    Interpersonal Safety     Do you feel physically and emotionally safe where you currently live?: Yes     Within the past 12 months, have you been hit, slapped, kicked or otherwise physically hurt by someone?: No     Within the past 12 months, have you been humiliated or emotionally abused in other ways by your partner or ex-partner?: No   Housing Stability: Low Risk  (9/29/2024)    Housing Stability     Do you have housing? : Yes     Are you  "worried about losing your housing?: No     Family History  Family History   Problem Relation Age of Onset    Hypertension Mother         alive age 59  DM, lipids    Depression Father         alive age 61 PTST    Thyroid Disease Sister         alive age 32    Family History Negative Sister         alive age  30 sinus problems    Depression Sister         alive age  27    Heart Disease Paternal Grandmother             Heart Disease Maternal Grandmother             Thyroid Disease Maternal Grandfather     Breast Cancer No family hx of     Ovarian Cancer No family hx of      Allergy    Allergies   Allergen Reactions    Sulfa Antibiotics      GI issues     Current Outpatient Medications   Medication Sig Dispense Refill    cholecalciferol 25 MCG (1000 UT) TABS Take 1 tablet by mouth daily (Patient not taking: Reported on 2023) 90 tablet 0    levonorgestrel (MIRENA) 20 MCG/24HR IUD 1 each (20 mcg) by Intrauterine route once for 1 dose Started 2013 1 each 0    levothyroxine (SYNTHROID/LEVOTHROID) 137 MCG tablet Take 1 tablet (137 mcg) by mouth daily. 30 tablet 2    levothyroxine (SYNTHROID/LEVOTHROID) 150 MCG tablet Take 1 tablet (150 mcg) by mouth daily. New dose. 90 tablet 0     No current facility-administered medications for this visit.     Physical Exam:   BP (!) 89/61 (BP Location: Left arm, Patient Position: Sitting, Cuff Size: Adult Regular)   Ht 1.676 m (5' 6\")   Wt 68 kg (150 lb)   SpO2 98%   BMI 24.21 kg/m    Gen:  alert, comfortable in no acute distress,   Abdomen: Abdomen is soft, non-tender, non-distended,   Lungs: non-labored breathing    Pelvic Exam:   Pelvic Exam:  Q tip test: negative for vulvodynia, negative for vestibulodynia  Clitoris: herman mobile with no adhesions or phimosis  Vulva: No external lesions, normal hair distribution, normal architecture, no hypopigmentation or skin changes  Vestibule: mucosa pink moist intact with no lesions, swelling or injury  Vagina: Moist, " pink, no abnormal discharge, well rugated, no lesions  Cervix: smooth, pink, no visible lesions  Uterus: Normal size, anteverted, non-tender, mobile  Adnexa: Non-tender, no masses  Anus: skin intact with no lesions, fissures or external hemorrhoids  Rectum: JOHNNA reveals smooth, intact mucosa with no lesions, masses, palpable hemorrhoids or posterior wall pouching    Pelvic floor strength: 3/5 kegels.    Pelvic floor muscles: no hypertonicity, full contraction and relaxation     Voiding trial:    POPQ EXAM FOR PROLAPSE SEVERITY  (Aa):   -1 (Ba):   -1 (C ):    -7   (GH):   3 (PB):  2 (TVL):  9   (Ap):   0 (Bp):  0 (D):   -9     ICS Stage (1-4):  2 posterior wall, UVJ hypermobility    VOID 400 ml  PVR 0 mL by Bladder ultrasound  Leak with Cough stress test : yes    Labs:   Color Urine (no units)   Date Value   08/28/2020 Yellow     Appearance Urine (no units)   Date Value   08/28/2020 Clear     Glucose Urine (mg/dL)   Date Value   08/28/2020 Negative     Bilirubin Urine (no units)   Date Value   08/28/2020 Negative     Ketones Urine (mg/dL)   Date Value   08/28/2020 Negative     Specific Gravity Urine (no units)   Date Value   08/28/2020 1.015     pH Urine (pH)   Date Value   08/28/2020 6.0     Protein Albumin Urine (mg/dL)   Date Value   08/28/2020 Negative     Urobilinogen Urine (EU/dL)   Date Value   08/28/2020 0.2     Nitrite Urine (no units)   Date Value   08/28/2020 Negative     Leukocyte Esterase Urine (no units)   Date Value   08/28/2020 Trace (A)     CBC RESULTS:   Recent Labs   Lab Test 01/17/18  0811   WBC 5.0   RBC 4.40   HGB 13.1   HCT 39.1   MCV 89   MCH 29.8   MCHC 33.5   RDW 12.0        A/P: Janae Stanton is a 44 year old F with mixed urinary incontinence.     Given posterior wall bulge, constipation and manual splinting with defecation, we will refer to PFPT in order to address this as well as the mixed urge and stress incontinence.     We discussed the benefits of starting vaginal estrogen  cream at this time as a supportive treatment to relieve urgency and Janae prefers to see what PT can improve before introducing any medications. We can always reconsider this in the future.      I spent a total of 45 minutes with  Janae Stanton  on the date of the encounter in chart review, face to face patient visit, review of tests, documentation and/or discussion with other providers about the issues documented above.     Veronica Sorensen DNP [facility]  Female Pelvic Medicine and Reconstructive Surgery ( Urogynecology )    I attest that I was present for the history and personally performed the physical exam and medical decision making and that I agree with the findings and treatment plan outlined above.     I spent a total of 45 minutes with  Ms. Stanton  on the date of the encounter in chart review, face to face patient visit, review of tests, documentation and/or discussion with other providers about the issues documented above.     Robin Sepulveda MD  Professor, OB/GYN  Urogynecologist    CC  Patient Care Team:  Josys Barron MD as PCP - General (Internal Medicine)  Jossy Barron MD as Assigned PCP  Kaleigh Griffin MD as MD (INTERNAL MEDICINE - ENDOCRINOLOGY, DIABETES & METABOLISM)  Robin Sepulveda MD as MD (OB/Gyn)  JOSSY BARRON

## 2025-01-11 ENCOUNTER — LAB (OUTPATIENT)
Dept: LAB | Facility: CLINIC | Age: 45
End: 2025-01-11
Payer: COMMERCIAL

## 2025-01-11 DIAGNOSIS — E06.3 HYPOTHYROIDISM DUE TO HASHIMOTO THYROIDITIS: ICD-10-CM

## 2025-01-11 LAB
T4 FREE SERPL-MCNC: 1.47 NG/DL (ref 0.9–1.7)
TSH SERPL DL<=0.005 MIU/L-ACNC: 1.69 UIU/ML (ref 0.3–4.2)

## 2025-01-11 PROCEDURE — 84439 ASSAY OF FREE THYROXINE: CPT

## 2025-01-11 PROCEDURE — 36415 COLL VENOUS BLD VENIPUNCTURE: CPT

## 2025-01-11 PROCEDURE — 84443 ASSAY THYROID STIM HORMONE: CPT

## 2025-01-14 DIAGNOSIS — E06.3 HYPOTHYROIDISM DUE TO HASHIMOTO THYROIDITIS: ICD-10-CM

## 2025-01-14 RX ORDER — LEVOTHYROXINE SODIUM 137 UG/1
137 TABLET ORAL DAILY
Qty: 30 TABLET | Refills: 2 | Status: SHIPPED | OUTPATIENT
Start: 2025-01-14

## 2025-01-16 ENCOUNTER — THERAPY VISIT (OUTPATIENT)
Dept: PHYSICAL THERAPY | Facility: CLINIC | Age: 45
End: 2025-01-16
Payer: COMMERCIAL

## 2025-01-16 DIAGNOSIS — N39.46 MIXED STRESS AND URGE URINARY INCONTINENCE: ICD-10-CM

## 2025-01-16 NOTE — PROGRESS NOTES
PHYSICAL THERAPY EVALUATION  Type of Visit: Evaluation       Fall Risk Screen:  Fall screen completed by: PT  Have you fallen 2 or more times in the past year?: No  Have you fallen and had an injury in the past year?: No  Is patient a fall risk?: No    Subjective         Presenting condition or subjective complaint: Incontinence, urgency, with and without activity  Pt reports urine leaking for the past 8 years or so. DANYA with running, jumping jacks, coughing/sneezing.   Pt also struggling with urgency at times, has cut back on bladder irritants, now feeling better after cutting out carbonated beverages.    Sometimes voiding every 4-5 hours. Drinks about 60-70 ounces of water daily.     Date of onset: 12/26/24 (MD order)    Relevant medical history: Incontinence; Migraines or headaches; Thyroid problems   Dates & types of surgery:      Prior diagnostic imaging/testing results:       Prior therapy history for the same diagnosis, illness or injury: No        Living Environment  Social support: With a significant other or spouse   Type of home: House; 2-story; Basement   Stairs to enter the home: Yes 2 Is there a railing: No     Ramp: No   Stairs inside the home: Yes 20 Is there a railing: Yes     Help at home: None  Equipment owned:       Employment: Yes Registered Nurse  Hobbies/Interests: Sports-    Patient goals for therapy: Go for a run, walk, cough, sneeze, without guarding or peeing,       Objective      PELVIC EVALUATION  ADDITIONAL HISTORY:  Sex assigned at birth: Female  Gender identity: Female    Pronouns: She/Her Hers      Bladder History:  Feels bladder filling: Yes  Triggers for feeling of inability to wait to go to the bathroom: No    How long can you wait to urinate: Hours  Gets up at night to urinate: Yes Ovcationally once  but mostly not at all  Can stop the flow of urine when urinating: Sometimes  Volume of urine usually released: Average   Other issues:    Number of bladder infections in last 12  months:    Fluid intake per day: 2000ml      Medications taken for bladder: No     Activities causing urine leak: Cough; Sneeze; Jump; Run; Hurrying to the bathroom due to a strong urge to urinate (pee); Other activities Lifting a heavy box, caught off guard or balance  Amount of urine typically leaked: Depends. ~5-30mls  Pads used to help with leaking: No        Bowel History:  Frequency of bowel movement:    Consistency of stool: Hard    Ignores the urge to defecate: Sometimes  Other bowel issues: Straining to have bowel movement  Length of time spent trying to have a bowel movement: 15 min. Then give up and try later    Sexual Function History:  Sexual orientation: Straight    Sexually active: Yes  Lubrication used: No No  Pelvic pain: Certain positions; Deep penetration (rectal or vaginal) Depends on angle of my hips  Pain or difficulty with orgasms/erection/ejaculation: Yes    State of menopause: Early menopause  Hormone medications: No      Are you currently pregnant: No  Number of previous pregnancies: 4  Number of deliveries: 3  If you have delivered before, did you have any of these issues during delivery: Tearing; Episiotomy; Vaginal delivery  Have you been diagnosed with pelvic prolapse or abdominal separation: No  Do you get regular exercise: Yes  I do this type of exercise: Walking  Have you tried pelvic floor strengthening exercises for 4 weeks: Yes  Do you have any history of trauma that is relevant to your care that you d like to share: No      Discussed reason for referral regarding pelvic health needs and external/internal pelvic floor muscle examination with patient/guardian.  Opportunity provided to ask questions and verbal consent for assessment and intervention was given.    POSTURE: WNL  Functional Strength Testing: Double Leg Squat: Able to perform full deep squat without pain    BREATHING SYMMETRY: Decreased rib cage mobility, shallow breathing pattern into neck/shoulders    PELVIC  EXAM  External Visual Inspection:  At rest: Normal  With voluntary pelvic floor contraction: Perineal elevation  Relaxation of PFM: Yes    Integumentary:   Introitus: Unremarkable    External Digital Palpation per Perineum:   Ischiocavernosis: Unremarkable  Bulbo cavernosis: Unremarkable  Transverse perineal: Unremarkable  Levator ani: Unremarkable    Internal Digital Palpation:  Per Vagina:  Tenderness  Digital Muscle Performance: P (Power): 3+/5 with exhale   Compensations: Breath holding  Tenderness at L OI and LA, improved with breathing and relaxation       Assessment & Plan   CLINICAL IMPRESSIONS  Medical Diagnosis: mixed incontinence    Treatment Diagnosis: mixed incontinence, constipation   Impression/Assessment: Patient is a 44 year old female with Mixed incontinence complaints.  The following significant findings have been identified: Pain, Decreased ROM/flexibility, Decreased strength, Impaired muscle performance, Decreased activity tolerance, and Impaired posture. These impairments interfere with their ability to perform self care tasks and recreational activities as compared to previous level of function.     Clinical Decision Making (Complexity):  Clinical Presentation: Stable/Uncomplicated  Clinical Presentation Rationale: based on medical and personal factors listed in PT evaluation  Clinical Decision Making (Complexity): Low complexity    PLAN OF CARE  Treatment Interventions:  Interventions: Manual Therapy, Neuromuscular Re-education, Therapeutic Activity, Therapeutic Exercise, Self-Care/Home Management    Long Term Goals            Frequency of Treatment: once per week  Duration of Treatment: 12 weeks    Recommended Referrals to Other Professionals:  NA  Education Assessment:   Learner/Method: Patient  Education Comments: Pt educated on PT role and plan of care    Risks and benefits of evaluation/treatment have been explained.   Patient/Family/caregiver agrees with Plan of Care.     Evaluation  Time:        Signing Clinician: Antoinette Stevens PT

## 2025-02-10 ENCOUNTER — TRANSFERRED RECORDS (OUTPATIENT)
Dept: HEALTH INFORMATION MANAGEMENT | Facility: CLINIC | Age: 45
End: 2025-02-10
Payer: COMMERCIAL

## 2025-02-28 PROBLEM — N39.46 MIXED STRESS AND URGE URINARY INCONTINENCE: Status: RESOLVED | Noted: 2025-01-16 | Resolved: 2025-02-28

## 2025-04-23 ENCOUNTER — PATIENT OUTREACH (OUTPATIENT)
Dept: CARE COORDINATION | Facility: CLINIC | Age: 45
End: 2025-04-23
Payer: COMMERCIAL

## 2025-04-28 DIAGNOSIS — E06.3 HYPOTHYROIDISM DUE TO HASHIMOTO THYROIDITIS: ICD-10-CM

## 2025-04-29 RX ORDER — LEVOTHYROXINE SODIUM 137 UG/1
137 TABLET ORAL DAILY
Qty: 90 TABLET | Refills: 2 | Status: SHIPPED | OUTPATIENT
Start: 2025-04-29

## 2025-04-29 NOTE — TELEPHONE ENCOUNTER
Medication passed protocol, however, refill RN could not approve because  there are two doses of the same medication active on patient med list, please review  Provider, please approve or deny the prescription.

## 2025-05-01 ENCOUNTER — HOSPITAL ENCOUNTER (OUTPATIENT)
Dept: MAMMOGRAPHY | Facility: CLINIC | Age: 45
Discharge: HOME | End: 2025-05-01
Attending: INTERNAL MEDICINE
Payer: COMMERCIAL

## 2025-05-01 DIAGNOSIS — Z00.00 ROUTINE GENERAL MEDICAL EXAMINATION AT A HEALTH CARE FACILITY: ICD-10-CM

## 2025-05-01 PROCEDURE — 77063 BREAST TOMOSYNTHESIS BI: CPT

## 2025-05-09 ENCOUNTER — RESULTS FOLLOW-UP (OUTPATIENT)
Dept: PEDIATRICS | Facility: CLINIC | Age: 45
End: 2025-05-09

## 2025-05-09 ENCOUNTER — HOSPITAL ENCOUNTER (OUTPATIENT)
Dept: ULTRASOUND IMAGING | Facility: CLINIC | Age: 45
Discharge: HOME | End: 2025-05-09
Attending: INTERNAL MEDICINE
Payer: COMMERCIAL

## 2025-05-09 DIAGNOSIS — R92.8 ABNORMAL MAMMOGRAM: ICD-10-CM

## 2025-05-09 PROCEDURE — 76642 ULTRASOUND BREAST LIMITED: CPT | Mod: LT
